# Patient Record
Sex: FEMALE | Race: WHITE | NOT HISPANIC OR LATINO | Employment: FULL TIME | RURAL
[De-identification: names, ages, dates, MRNs, and addresses within clinical notes are randomized per-mention and may not be internally consistent; named-entity substitution may affect disease eponyms.]

---

## 2024-04-17 ENCOUNTER — OFFICE VISIT (OUTPATIENT)
Dept: FAMILY MEDICINE CLINIC | Facility: CLINIC | Age: 24
End: 2024-04-17
Payer: COMMERCIAL

## 2024-04-17 VITALS
DIASTOLIC BLOOD PRESSURE: 80 MMHG | BODY MASS INDEX: 45.65 KG/M2 | WEIGHT: 267.38 LBS | SYSTOLIC BLOOD PRESSURE: 126 MMHG | HEIGHT: 64 IN | OXYGEN SATURATION: 100 % | HEART RATE: 92 BPM

## 2024-04-17 DIAGNOSIS — Z13.1 SCREENING FOR DIABETES MELLITUS: ICD-10-CM

## 2024-04-17 DIAGNOSIS — Z72.0 TOBACCO USE: ICD-10-CM

## 2024-04-17 DIAGNOSIS — E66.01 MORBID (SEVERE) OBESITY DUE TO EXCESS CALORIES: ICD-10-CM

## 2024-04-17 DIAGNOSIS — J30.2 SEASONAL ALLERGIES: ICD-10-CM

## 2024-04-17 DIAGNOSIS — Z11.59 NEED FOR HEPATITIS C SCREENING TEST: ICD-10-CM

## 2024-04-17 DIAGNOSIS — Z13.220 SCREENING, LIPID: ICD-10-CM

## 2024-04-17 DIAGNOSIS — Z00.00 ANNUAL PHYSICAL EXAM: Primary | ICD-10-CM

## 2024-04-17 DIAGNOSIS — Z12.4 SCREENING FOR CERVICAL CANCER: ICD-10-CM

## 2024-04-17 DIAGNOSIS — Z23 IMMUNIZATION DUE: ICD-10-CM

## 2024-04-17 LAB
BILIRUB BLD-MCNC: NEGATIVE MG/DL
CLARITY, POC: CLEAR
COLOR UR: YELLOW
EXPIRATION DATE: NORMAL
GLUCOSE UR STRIP-MCNC: NEGATIVE MG/DL
KETONES UR QL: NEGATIVE
LEUKOCYTE EST, POC: NEGATIVE
Lab: NORMAL
NITRITE UR-MCNC: NEGATIVE MG/ML
PH UR: 5.5 [PH] (ref 5–8)
PROT UR STRIP-MCNC: NEGATIVE MG/DL
RBC # UR STRIP: NEGATIVE /UL
SP GR UR: 1.03 (ref 1–1.03)
UROBILINOGEN UR QL: NORMAL

## 2024-04-17 NOTE — PATIENT INSTRUCTIONS
Obesity, Adult  Obesity is the condition of having too much total body fat. Being overweight or obese means that your weight is greater than what is considered healthy for your body size. Obesity is determined by a measurement called BMI (body mass index). BMI is an estimate of body fat and is calculated from height and weight. For adults, a BMI of 30 or higher is considered obese.  Obesity can lead to other health concerns and major illnesses, including:  Stroke.  Coronary artery disease (CAD).  Type 2 diabetes.  Some types of cancer, including cancers of the colon, breast, uterus, and gallbladder.  High blood pressure (hypertension).  High cholesterol.  Gallbladder stones.  Obesity can also contribute to:  Osteoarthritis.  Sleep apnea.  Infertility problems.  What are the causes?  Common causes of this condition include:  Eating daily meals that are high in calories, sugar, and fat.  Drinking high amounts of sugar-sweetened beverages, such as soft drinks.  Being born with genes that may make you more likely to become obese.  Having a medical condition that causes obesity, including:  Hypothyroidism.  Polycystic ovarian syndrome (PCOS).  Binge-eating disorder.  Cushing syndrome.  Taking certain medicines, such as steroids, antidepressants, and seizure medicines.  Not being physically active (sedentary lifestyle).  Not getting enough sleep.  What increases the risk?  The following factors may make you more likely to develop this condition:  Having a family history of obesity.  Living in an area with limited access to:  [place], recreation centers, or sidewalks.  Healthy food choices, such as grocery stores and [employer] markets.  What are the signs or symptoms?  The main sign of this condition is having too much body fat.  How is this diagnosed?  This condition is diagnosed based on:  Your BMI. If you are an adult with a BMI of 30 or higher, you are considered obese.  Your waist circumference. This measures the  distance around your waistline.  Your skinfold thickness. Your health care provider may gently pinch a fold of your skin and measure it.  You may have other tests to check for underlying conditions.  How is this treated?  Treatment for this condition often includes changing your lifestyle. Treatment may include some or all of the following:  Dietary changes. This may include developing a healthy meal plan.  Regular physical activity. This may include activity that causes your heart to beat faster (aerobic exercise) and strength training. Work with your health care provider to design an exercise program that works for you.  Medicine to help you lose weight if you are unable to lose one pound a week after six weeks of healthy eating and more physical activity.  Treating conditions that cause the obesity (underlying conditions).  Surgery. Surgical options may include gastric banding and gastric bypass. Surgery may be done if:  Other treatments have not helped to improve your condition.  You have a BMI of 40 or higher.  You have life-threatening health problems related to obesity.  Follow these instructions at home:  Eating and drinking    Follow recommendations from your health care provider about what you eat and drink. Your health care provider may advise you to:  Limit fast food, sweets, and processed snack foods.  Choose low-fat options, such as low-fat milk instead of whole milk.  Eat five or more servings of fruits or vegetables every day.  Choose healthy foods when you eat out.  Keep low-fat snacks available.  Limit sugary drinks, such as soda, fruit juice, sweetened iced tea, and flavored milk.  Drink enough water to keep your urine pale yellow.  Do not follow a fad diet. Fad diets can be unhealthy and even dangerous.  Other healthful choices include:  Eat at home more often. This gives you more control over what you eat.  Learn to read food labels. This will help you understand how much food is considered one  serving.  Learn what a healthy serving size is.  Physical activity  Exercise regularly, as told by your health care provider.  Most adults should get up to 150 minutes of moderate-intensity exercise every week.  Ask your health care provider what types of exercise are safe for you and how often you should exercise.  Warm up and stretch before being active.  Cool down and stretch after being active.  Rest between periods of activity.  Lifestyle  Work with your health care provider and a dietitian to set a weight-loss goal that is healthy and reasonable for you.  Limit your screen time.  Find ways to reward yourself that do not involve food.  Do not drink alcohol if:  Your health care provider tells you not to drink.  You are pregnant, may be pregnant, or are planning to become pregnant.  If you drink alcohol:  Limit how much you have to:  0-1 drink a day for women.  0-2 drinks a day for men.  Know how much alcohol is in your drink. In the [place] one drink equals one 12 oz bottle of beer (355 mL), one 5 oz glass of wine (148 mL), or one 1½ oz glass of hard liquor (44 mL).  General instructions  Keep a weight-loss journal to keep track of the food you eat and how much exercise you get.  Take over-the-counter and prescription medicines only as told by your health care provider.  Take vitamins and supplements only as told by your health care provider.  Consider joining a support group. Your health care provider may be able to recommend a support group.  Pay attention to your mental health as obesity can lead to depression or self esteem issues.  Keep all follow-up visits. This is important.  Contact a health care provider if:  You are unable to meet your weight-loss goal after six weeks of dietary and lifestyle changes.  You have trouble breathing.  Summary  Obesity is the condition of having too much total body fat.  Being overweight or obese means that your weight is greater than what is considered healthy for your body  size.  Work with your health care provider and a dietitian to set a weight-loss goal that is healthy and reasonable for you.  Exercise regularly, as told by your health care provider. Ask your health care provider what types of exercise are safe for you and how often you should exercise.  This information is not intended to replace advice given to you by your health care provider. Make sure you discuss any questions you have with your health care provider.  Document Revised: 07/26/2022 Document Reviewed: 07/26/2022  [email or web address] Patient Education © 2023 [email or web address]    Exercising to Lose Weight  Getting regular exercise is important for everyone. It is especially important if you are overweight. Being overweight increases your risk of heart disease, stroke, diabetes, high blood pressure, and several types of cancer. Exercising, and reducing the calories you consume, can help you lose weight and improve fitness and health.  Exercise can be moderate or vigorous intensity. To lose weight, most people need to do a certain amount of moderate or vigorous-intensity exercise each week.  How can exercise affect me?  You lose weight when you exercise enough to burn more calories than you eat. Exercise also reduces body fat and builds muscle. The more muscle you have, the more calories you burn. Exercise also:  Improves mood.  Reduces stress and tension.  Improves your overall fitness, flexibility, and endurance.  Increases bone strength.  Moderate-intensity exercise    Moderate-intensity exercise is any activity that gets you moving enough to burn at least three times more energy (calories) than if you were sitting.  Examples of moderate exercise include:  Walking a mile in 15 minutes.  Doing light yard work.  Biking at an easy pace.  Most people should get at least 150 minutes of moderate-intensity exercise a week to maintain their body weight.  Vigorous-intensity exercise  Vigorous-intensity exercise is any activity that gets  you moving enough to burn at least six times more calories than if you were sitting. When you exercise at this intensity, you should be working hard enough that you are not able to carry on a conversation.  Examples of vigorous exercise include:  Running.  Playing a team sport, such as football, basketball, and soccer.  Jumping rope.  Most people should get at least 75 minutes a week of vigorous exercise to maintain their body weight.  What actions can I take to lose weight?  The amount of exercise you need to lose weight depends on:  Your age.  The type of exercise.  Any health conditions you have.  Your overall physical ability.  Talk to your health care provider about how much exercise you need and what types of activities are safe for you.  Nutrition    Make changes to your diet as told by your health care provider or diet and nutrition specialist (dietitian). This may include:  Eating fewer calories.  Eating more protein.  Eating less unhealthy fats.  Eating a diet that includes fresh fruits and vegetables, whole grains, low-fat dairy products, and lean protein.  Avoiding foods with added fat, salt, and sugar.  Drink plenty of water while you exercise to prevent dehydration or heat stroke.  Activity  Choose an activity that you enjoy and set realistic goals. Your health care provider can help you make an exercise plan that works for you.  Exercise at a moderate or vigorous intensity most days of the week.  The intensity of exercise may vary from person to person. You can tell how intense a workout is for you by paying attention to your breathing and heartbeat. Most people will notice their breathing and heartbeat get faster with more intense exercise.  Do resistance training twice each week, such as:  Push-ups.  Sit-ups.  Lifting weights.  Using resistance bands.  Getting short amounts of exercise can be just as helpful as long, structured periods of exercise. If you have trouble finding time to exercise, try  doing these things as part of your daily routine:  Get up, stretch, and walk around every 30 minutes throughout the day.  Go for a walk during your lunch break.  Park your car farther away from your destination.  If you take public transportation, get off one stop early and walk the rest of the way.  Make phone calls while standing up and walking around.  Take the stairs instead of elevators or escalators.  Wear comfortable clothes and shoes with good support.  Do not exercise so much that you hurt yourself, feel dizzy, or get very short of breath.  Where to find more information  [place] Department of Health and Human Services: www.hhs.gov  Centers for Disease Control and Prevention: www.cdc.gov  Contact a health care provider:  Before starting a new exercise program.  If you have questions or concerns about your weight.  If you have a medical problem that keeps you from exercising.  Get help right away if:  You have any of the following while exercising:  Injury.  Dizziness.  Difficulty breathing or shortness of breath that does not go away when you stop exercising.  Chest pain.  Rapid heartbeat.  These symptoms may represent a serious problem that is an emergency. Do not wait to see if the symptoms will go away. Get medical help right away. Call your local emergency services (911 in the [place] Do not drive yourself to the hospital.  Summary  Getting regular exercise is especially important if you are overweight.  Being overweight increases your risk of heart disease, stroke, diabetes, high blood pressure, and several types of cancer.  Losing weight happens when you burn more calories than you eat.  Reducing the amount of calories you eat, and getting regular moderate or vigorous exercise each week, helps you lose weight.  This information is not intended to replace advice given to you by your health care provider. Make sure you discuss any questions you have with your health care provider.  Document Revised:  02/13/2022 Document Reviewed: 02/13/2022  [employer] Patient Education © 2023 [employer]    MyPlate from [employer]    MyPlate is an outline of a general healthy diet based on the Dietary Guidelines for Americans, 6836-8737, from the [place] Department of Agriculture (USDA). It sets guidelines for how much food you should eat from each food group based on your age, sex, and level of physical activity.  What are tips for following MyPlate?  To follow MyPlate recommendations:  Eat a wide variety of fruits and vegetables, grains, and protein foods.  Serve smaller portions and eat less food throughout the day.  Limit portion sizes to avoid overeating.  Enjoy your food.  Get at least 150 minutes of exercise every week. This is about 30 minutes each day, 5 or more days per week.  It can be difficult to have every meal look like MyPlate. Think about MyPlate as eating guidelines for an entire day, rather than each individual meal.  Fruits and vegetables  Make one half of your plate fruits and vegetables.  Eat many different colors of fruits and vegetables each day.  For a 2,000-calorie daily food plan, eat:  2½ cups of vegetables every day.  2 cups of fruit every day.  1 cup is equal to:  1 cup raw or cooked vegetables.  1 cup raw fruit.  1 medium-sized orange, apple, or banana.  1 cup 100% fruit or vegetable juice.  2 cups raw leafy greens, such as lettuce, spinach, or kale.  ½ cup dried fruit.  Grains  One fourth of your plate should be grains.  Make at least half of the grains you eat each day whole grains.  For a 2,000-calorie daily food plan, eat 6 oz of grains every day.  1 oz is equal to:  1 slice bread.  1 cup cereal.  ½ cup cooked rice, cereal, or pasta.  Protein  One fourth of your plate should be protein.  Eat a wide variety of protein foods, including meat, poultry, fish, eggs, beans, nuts, and tofu.  For a 2,000-calorie daily food plan, eat 5½ oz of protein every day.  1 oz is equal to:  1 oz meat, poultry, or fish.  ¼  cup cooked beans.  1 egg.  ½ oz nuts or seeds.  1 Tbsp peanut butter.  Dairy  Drink fat-free or low-fat (1%) milk.  Eat or drink dairy as a side to meals.  For a 2,000-calorie daily food plan, eat or drink 3 cups of dairy every day.  1 cup is equal to:  1 cup milk, yogurt, cottage cheese, or soy milk (soy beverage).  2 oz processed cheese.  1½ oz natural cheese.  Fats, oils, salt, and sugars  Only small amounts of oils are recommended.  Avoid foods that are high in calories and low in nutritional value (empty calories), like foods high in fat or added sugars.  Choose foods that are low in salt (sodium). Choose foods that have less than 140 milligrams (mg) of sodium per serving.  Drink water instead of sugary drinks. Drink enough fluid to keep your urine pale yellow.  Where to find support  Work with your health care provider or a dietitian to develop a customized eating plan that is right for you.  Download an husam (mobile application) to help you track your daily food intake.  Where to find more information  USDA: ChooseMyPlate.gov  Summary  MyPlate is a general guideline for healthy eating from the USDA. It is based on the Dietary Guidelines for Americans, 3897-7052.  In general, fruits and vegetables should take up one half of your plate, grains should take up one fourth of your plate, and protein should take up one fourth of your plate.  This information is not intended to replace advice given to you by your health care provider. Make sure you discuss any questions you have with your health care provider.  Document Revised: 11/08/2021 Document Reviewed: 11/08/2021  [employer] Patient Education © 2023 [employer]    Steps to Quit Smoking  Smoking tobacco is the leading cause of preventable death. It can affect almost every organ in the body. Smoking puts you and those around you at risk for developing many serious chronic diseases.  Quitting smoking can be very challenging. Do not get discouraged if you are not  successful the first time. Some people need to make many attempts to quit before they achieve long-term success. Do your best to stick to your quit plan, and talk with your health care provider if you have any questions or concerns.  How do I get ready to quit?  When you decide to quit smoking, create a plan to help you succeed. Before you quit:  Pick a date to quit. Set a date within the next 2 weeks to give you time to prepare.  Write down the reasons why you are quitting. Keep this list in places where you will see it often.  Tell your family, friends, and co-workers that you are quitting. Support from people you are close to can make quitting easier.  Talk with your health care provider about your options for quitting smoking.  Find out what treatment options are covered by your health insurance.  Identify people, places, things, and activities that make you want to smoke (triggers). Avoid them.  What first steps can I take to quit smoking?  Throw away all cigarettes at home, at work, and in your car.  Throw away smoking accessories, such as ashtrays and lighters.  Clean your car. Make sure to empty the ashtray.  Clean your home, including curtains and carpets.  What strategies can I use to quit smoking?  Talk with your health care provider about combining strategies, such as taking medicines while you are also receiving in-person counseling. Using these two strategies together makes you more likely to succeed in quitting than if you used either strategy on its own.  If you are pregnant or breastfeeding, talk with your health care provider about finding counseling or other support strategies to quit smoking. Do not take medicine to help you quit smoking unless your health care provider tells you to.  Quit right away  Quit smoking completely, instead of gradually reducing how much you smoke over a period of time. Stopping smoking right away may be more successful than gradually quitting.  Attend in-person  counseling to help you build problem-solving skills. You are more likely to succeed in quitting if you attend counseling sessions regularly. Even short sessions of 10 minutes can be effective.  Take medicine  You may take medicines to help you quit smoking. Some medicines require a prescription. You can also purchase over-the-counter medicines. Medicines may have nicotine in them to replace the nicotine in cigarettes. Medicines may:  Help to stop cravings.  Help to relieve withdrawal symptoms.  Your health care provider may recommend:  Nicotine patches, gum, or lozenges.  Nicotine inhalers or sprays.  Non-nicotine medicine that you take by mouth.  Find resources  Find resources and support systems that can help you quit smoking and remain smoke-free after you quit. These resources are most helpful when you use them often. They include:  Online chats with a counselor.  Telephone quitlines.  Printed self-help materials.  Support groups or group counseling.  Text messaging programs.  Mobile phone apps or applications. Use apps that can help you stick to your quit plan by providing reminders, tips, and encouragement. Examples of free services include Quit Guide from the CDC and smokefree.gov    What can I do to make it easier to quit?    Reach out to your family and friends for support and encouragement. Call telephone quitlines, such as 9-800-QUIT-NOW, reach out to support groups, or work with a counselor for support.  Ask people who smoke to avoid smoking around you.  Avoid places that trigger you to smoke, such as bars, parties, or smoke-break areas at work.  Spend time with people who do not smoke.  Lessen the stress in your life. Stress can be a smoking trigger for some people. To lessen stress, try:  Exercising regularly.  Doing deep-breathing exercises.  Doing yoga.  Meditating.  What benefits will I see if I quit smoking?  Over time, you should start to see positive results, such as:  Improved sense of smell and  taste.  Decreased coughing and sore throat.  Slower heart rate.  Lower blood pressure.  Clearer and healthier skin.  The ability to breathe more easily.  Fewer sick days.  Summary  Quitting smoking can be very challenging. Do not get discouraged if you are not successful the first time. Some people need to make many attempts to quit before they achieve long-term success.  When you decide to quit smoking, create a plan to help you succeed.  Quit smoking right away, not slowly over a period of time.  Find resources and support systems that can help you quit smoking and remain smoke-free after you quit.  This information is not intended to replace advice given to you by your health care provider. Make sure you discuss any questions you have with your health care provider.  Document Revised: 12/09/2022 Document Reviewed: 12/09/2022  Elsevier Patient Education © 2023 [employer]

## 2024-04-17 NOTE — ASSESSMENT & PLAN NOTE
Labs drawn.  UA completed.  Goal blood pressure less than 140/90.  Let me know if gets 2 or above that.  PHQ-9 completed and discussed.  No thoughts of suicide or hurting herself or anyone else.  Proper diet and exercise plan discussed and encouraged.  Patient states she will keep her appointment with gynecology Dr. Villanueva in June as scheduled and states she will discuss Pap smears with them.  She knows she is due a Pap smear.  Encouraged her to quit vaping.  Tdap vaccine given today in clinic.  Vaccine information sheet given.  Risk discussed and understood.  Patient tolerated well.  Patient states no risk or chance of pregnancy.  Annual dental exams encouraged.  Education provided.  Return to clinic or ED with any issues or concerns.

## 2024-04-17 NOTE — PROGRESS NOTES
"Chief Complaint  Annual Exam    Subjective          Laura Dexter presents to [facility] for preventative yearly exam.   History of Present Illness    Patient presents for annual exam.  Is fasting.  No smoking but she does vape.  No alcohol use.  Tries to watch her diet she does stay active.  States she has an appointment scheduled with gynecology Dr. Villanueva in June and states she will discuss Pap smears with them.  She would like to get a Tdap vaccine today in clinic.  Denies COVID vaccines.  Would like to have blood work completed including an A1c due to family history of diabetes.  Denies any risk or chance of STDs and denies being tested.  No dizziness no headache no chest pain no chest pressure no shortness of breath no trouble breathing no urinary or bowel issues.  Overall states she is doing well.    Objective   Vital Signs:   /80   Pulse 92   Ht 162.6 cm (64\")   Wt 121 kg (267 lb 6 oz)   SpO2 100%   BMI 45.89 kg/m²     Body mass index is 45.89 kg/m².    Predictive Model Details   No score data available for Risk of Fall        PHQ-9 Depression Screening  Little interest or pleasure in doing things? 0-->not at all   Feeling down, depressed, or hopeless? 0-->not at all   Trouble falling or staying asleep, or sleeping too much?     Feeling tired or having little energy?     Poor appetite or overeating?     Feeling bad about yourself - or that you are a failure or have let yourself or your family down?     Trouble concentrating on things, such as reading the newspaper or watching television?     Moving or speaking so slowly that other people could have noticed? Or the opposite - being so fidgety or restless that you have been moving around a lot more than usual?     Thoughts that you would be better off dead, or of hurting yourself in some way?     PHQ-9 Total Score 0   If you checked off any problems, how difficult have these problems made it for you to do your work, take " care of things at home, or get along with other people?       Patient screened positive for depression based on a PHQ-9 score of 0 on 4/17/2024. Follow-up recommendations include:        Immunization History   Administered Date(s) Administered    COVID-19 (MODERNA) 1st,2nd,3rd Dose Monovalent 03/31/2021, 04/28/2021    COVID-19 (MODERNA) Monovalent Original Booster 12/23/2021    DTaP 2002, 2002, 02/04/2003, 11/05/2003, 07/25/2007    DTaP, Unspecified 2002, 2002, 02/04/2003, 11/05/2003, 07/25/2007    Hep A, 2 Dose 08/05/2013, 07/29/2015    Hep B, Adolescent or Pediatric 2002, 2002, 08/13/2003    Hib (PRP-OMP) 2002, 2002, 08/13/2003    Hib (PRP-T) 2002, 2002    Hpv9 11/16/2018, 11/21/2019, 05/08/2020    IPV 2002, 2002, 02/04/2003, 07/25/2007    Influenza LAIV (Nasal) 09/11/2013    Influenza Seasonal Injectable 09/10/2012    MMR 11/05/2003, 07/25/2007    Meningococcal B,(Bexsero) 11/16/2018, 11/21/2019    Meningococcal C Conjugate 08/05/2013    Meningococcal MCV4P (Menactra) 08/05/2013, 11/16/2018    Meningococcal, Unspecified 08/05/2013    PEDS-Pneumococcal Conjugate (PCV7) 2002, 2002, 02/04/2003, 11/05/2003    Polio, Unspecified 2002, 2002, 02/04/2003, 07/25/2007    Tdap 08/05/2013    Varicella 08/13/2003, 07/25/2007       Review of Systems   Constitutional: Negative.    HENT: Negative.     Eyes: Negative.    Respiratory: Negative.     Cardiovascular: Negative.    Gastrointestinal: Negative.    Endocrine: Negative for polydipsia, polyphagia and polyuria.   Genitourinary: Negative.    Musculoskeletal: Negative.    Skin: Negative.    Neurological: Negative.    Psychiatric/Behavioral: Negative.         Past History:  Medical History: has no past medical history on file.   Surgical History: has a past surgical history that includes Tonsillectomy; Adenoidectomy; and Radial Osteotomy (Right).   Family History: family history is  not on file.   Social History: reports that she has never smoked. She has never used smokeless tobacco. She reports current alcohol use. She reports that she does not use drugs.      Current Outpatient Medications:     loratadine (CLARITIN) 10 MG tablet, Take 1 tablet by mouth Daily., Disp: , Rfl:    Allergies: Omnicef [cefdinir] and Strawberry flavor    Physical Exam  Constitutional:       Appearance: Normal appearance.   HENT:      Head: Normocephalic.      Right Ear: Tympanic membrane, ear canal and external ear normal.      Left Ear: Tympanic membrane, ear canal and external ear normal.      Nose: Nose normal.      Mouth/Throat:      Mouth: Mucous membranes are moist.      Pharynx: Oropharynx is clear.   Eyes:      Extraocular Movements: Extraocular movements intact.      Conjunctiva/sclera: Conjunctivae normal.      Pupils: Pupils are equal, round, and reactive to light.   Cardiovascular:      Rate and Rhythm: Normal rate and regular rhythm.      Heart sounds: Normal heart sounds.   Pulmonary:      Effort: Pulmonary effort is normal.      Breath sounds: Normal breath sounds.   Abdominal:      General: Abdomen is flat. Bowel sounds are normal.      Palpations: Abdomen is soft.   Genitourinary:     Comments: Denied   Musculoskeletal:         General: Normal range of motion.      Cervical back: Normal range of motion.   Skin:     General: Skin is warm.   Neurological:      General: No focal deficit present.      Mental Status: She is alert and oriented to person, place, and time. Mental status is at baseline.   Psychiatric:         Mood and Affect: Mood normal.         Behavior: Behavior normal.         Thought Content: Thought content normal.         Judgment: Judgment normal.          Result Review :                     Assessment and Plan    Diagnoses and all orders for this visit:    1. Annual physical exam (Primary)  Assessment & Plan:  Labs drawn.  UA completed.  Goal blood pressure less than 140/90.  Let me  know if gets 2 or above that.  PHQ-9 completed and discussed.  No thoughts of suicide or hurting herself or anyone else.  Proper diet and exercise plan discussed and encouraged.  Patient states she will keep her appointment with gynecology Dr. Villanueva in June as scheduled and states she will discuss Pap smears with them.  She knows she is due a Pap smear.  Encouraged her to quit vaping.  Tdap vaccine given today in clinic.  Vaccine information sheet given.  Risk discussed and understood.  Patient tolerated well.  Patient states no risk or chance of pregnancy.  Annual dental exams encouraged.  Education provided.  Return to clinic or ED with any issues or concerns.    Orders:  -     CBC & Differential  -     Comprehensive Metabolic Panel  -     TSH Rfx On Abnormal To Free T4  -     POC Urinalysis Dipstick, Automated; Future    2. Screening for diabetes mellitus  -     Hemoglobin A1c    3. Screening for cervical cancer    4. Need for hepatitis C screening test  -     Hepatitis C Antibody    5. Immunization due  -     Tdap Vaccine => 6yo IM (BOOSTRIX)    6. Screening, lipid  -     Lipid Panel    7. Seasonal allergies    8. Morbid (severe) obesity due to excess calories    9. Tobacco use              Class 3 Severe Obesity (BMI >=40). Obesity-related health conditions include the following: none. Obesity is unchanged. BMI is is above average; BMI management plan is completed. We discussed portion control and increasing exercise.       Follow Up   Return in about 1 year (around 4/17/2025).  Patient was given instructions and counseling regarding her condition or for health maintenance advice. Please see specific information pulled into the AVS if appropriate.     ELEAZAR Pena

## 2024-04-18 LAB
ALBUMIN SERPL-MCNC: 4.3 G/DL (ref 4–5)
ALBUMIN/GLOB SERPL: 1.7 {RATIO} (ref 1.2–2.2)
ALP SERPL-CCNC: 109 IU/L (ref 44–121)
ALT SERPL-CCNC: 21 IU/L (ref 0–32)
AST SERPL-CCNC: 22 IU/L (ref 0–40)
BASOPHILS # BLD AUTO: 0.1 X10E3/UL (ref 0–0.2)
BASOPHILS NFR BLD AUTO: 1 %
BILIRUB SERPL-MCNC: 0.4 MG/DL (ref 0–1.2)
BUN SERPL-MCNC: 9 MG/DL (ref 6–20)
BUN/CREAT SERPL: 11 (ref 9–23)
CALCIUM SERPL-MCNC: 9 MG/DL (ref 8.7–10.2)
CHLORIDE SERPL-SCNC: 101 MMOL/L (ref 96–106)
CHOLEST SERPL-MCNC: 115 MG/DL (ref 100–199)
CO2 SERPL-SCNC: 22 MMOL/L (ref 20–29)
CREAT SERPL-MCNC: 0.8 MG/DL (ref 0.57–1)
EGFRCR SERPLBLD CKD-EPI 2021: 107 ML/MIN/1.73
EOSINOPHIL # BLD AUTO: 0.2 X10E3/UL (ref 0–0.4)
EOSINOPHIL NFR BLD AUTO: 2 %
ERYTHROCYTE [DISTWIDTH] IN BLOOD BY AUTOMATED COUNT: 16.7 % (ref 11.7–15.4)
GLOBULIN SER CALC-MCNC: 2.6 G/DL (ref 1.5–4.5)
GLUCOSE SERPL-MCNC: 85 MG/DL (ref 70–99)
HBA1C MFR BLD: 5.7 % (ref 4.8–5.6)
HCT VFR BLD AUTO: 29.6 % (ref 34–46.6)
HCV IGG SERPL QL IA: NON REACTIVE
HDLC SERPL-MCNC: 43 MG/DL
HGB BLD-MCNC: 7.9 G/DL (ref 11.1–15.9)
IMM GRANULOCYTES # BLD AUTO: 0.1 X10E3/UL (ref 0–0.1)
IMM GRANULOCYTES NFR BLD AUTO: 1 %
LDLC SERPL CALC-MCNC: 53 MG/DL (ref 0–99)
LYMPHOCYTES # BLD AUTO: 3.1 X10E3/UL (ref 0.7–3.1)
LYMPHOCYTES NFR BLD AUTO: 26 %
MCH RBC QN AUTO: 18.2 PG (ref 26.6–33)
MCHC RBC AUTO-ENTMCNC: 26.7 G/DL (ref 31.5–35.7)
MCV RBC AUTO: 68 FL (ref 79–97)
MONOCYTES # BLD AUTO: 0.6 X10E3/UL (ref 0.1–0.9)
MONOCYTES NFR BLD AUTO: 5 %
NEUTROPHILS # BLD AUTO: 7.8 X10E3/UL (ref 1.4–7)
NEUTROPHILS NFR BLD AUTO: 65 %
PLATELET # BLD AUTO: 530 X10E3/UL (ref 150–450)
POTASSIUM SERPL-SCNC: 3.9 MMOL/L (ref 3.5–5.2)
PROT SERPL-MCNC: 6.9 G/DL (ref 6–8.5)
RBC # BLD AUTO: 4.34 X10E6/UL (ref 3.77–5.28)
SODIUM SERPL-SCNC: 138 MMOL/L (ref 134–144)
TRIGL SERPL-MCNC: 101 MG/DL (ref 0–149)
TSH SERPL DL<=0.005 MIU/L-ACNC: 2.39 UIU/ML (ref 0.45–4.5)
VLDLC SERPL CALC-MCNC: 19 MG/DL (ref 5–40)
WBC # BLD AUTO: 11.9 X10E3/UL (ref 3.4–10.8)

## 2024-04-23 DIAGNOSIS — D64.9 ANEMIA, UNSPECIFIED TYPE: Primary | ICD-10-CM

## 2024-04-23 DIAGNOSIS — R79.89 HIGH PLATELET COUNT: ICD-10-CM

## 2024-04-23 DIAGNOSIS — D72.829 LEUKOCYTOSIS, UNSPECIFIED TYPE: ICD-10-CM

## 2024-04-24 ENCOUNTER — TELEPHONE (OUTPATIENT)
Dept: FAMILY MEDICINE CLINIC | Facility: CLINIC | Age: 24
End: 2024-04-24
Payer: COMMERCIAL

## 2024-04-24 NOTE — TELEPHONE ENCOUNTER
Lvm  Hub to relay  LVM for pt to call us back to schedule lab appt for added on labs that needed to be drawn based on lab results

## 2024-04-26 ENCOUNTER — LAB (OUTPATIENT)
Dept: FAMILY MEDICINE CLINIC | Facility: CLINIC | Age: 24
End: 2024-04-26
Payer: COMMERCIAL

## 2024-04-27 LAB
BASOPHILS # BLD AUTO: 0.1 X10E3/UL (ref 0–0.2)
BASOPHILS NFR BLD AUTO: 1 %
EOSINOPHIL # BLD AUTO: 0.1 X10E3/UL (ref 0–0.4)
EOSINOPHIL NFR BLD AUTO: 1 %
ERYTHROCYTE [DISTWIDTH] IN BLOOD BY AUTOMATED COUNT: 16.6 % (ref 11.7–15.4)
FERRITIN SERPL-MCNC: 6 NG/ML (ref 15–150)
FOLATE SERPL-MCNC: 8.7 NG/ML
HCT VFR BLD AUTO: 27.9 % (ref 34–46.6)
HGB BLD-MCNC: 7.7 G/DL (ref 11.1–15.9)
IMM GRANULOCYTES # BLD AUTO: 0.1 X10E3/UL (ref 0–0.1)
IMM GRANULOCYTES NFR BLD AUTO: 1 %
IRON SERPL-MCNC: 17 UG/DL (ref 27–159)
LYMPHOCYTES # BLD AUTO: 2.9 X10E3/UL (ref 0.7–3.1)
LYMPHOCYTES NFR BLD AUTO: 23 %
MCH RBC QN AUTO: 18.3 PG (ref 26.6–33)
MCHC RBC AUTO-ENTMCNC: 27.6 G/DL (ref 31.5–35.7)
MCV RBC AUTO: 66 FL (ref 79–97)
MONOCYTES # BLD AUTO: 0.6 X10E3/UL (ref 0.1–0.9)
MONOCYTES NFR BLD AUTO: 5 %
NEUTROPHILS # BLD AUTO: 8.8 X10E3/UL (ref 1.4–7)
NEUTROPHILS NFR BLD AUTO: 69 %
PLATELET # BLD AUTO: 449 X10E3/UL (ref 150–450)
RBC # BLD AUTO: 4.2 X10E6/UL (ref 3.77–5.28)
VIT B12 SERPL-MCNC: 519 PG/ML (ref 232–1245)
WBC # BLD AUTO: 12.6 X10E3/UL (ref 3.4–10.8)

## 2024-06-05 ENCOUNTER — OFFICE VISIT (OUTPATIENT)
Dept: OBSTETRICS AND GYNECOLOGY | Age: 24
End: 2024-06-05
Payer: COMMERCIAL

## 2024-06-05 VITALS
HEIGHT: 64 IN | SYSTOLIC BLOOD PRESSURE: 122 MMHG | BODY MASS INDEX: 44.39 KG/M2 | DIASTOLIC BLOOD PRESSURE: 64 MMHG | WEIGHT: 260 LBS

## 2024-06-05 DIAGNOSIS — Z01.419 WELL FEMALE EXAM WITH ROUTINE GYNECOLOGICAL EXAM: Primary | ICD-10-CM

## 2024-06-05 DIAGNOSIS — Z12.4 SCREENING FOR MALIGNANT NEOPLASM OF CERVIX: ICD-10-CM

## 2024-06-05 DIAGNOSIS — D50.0 IRON DEFICIENCY ANEMIA DUE TO CHRONIC BLOOD LOSS: ICD-10-CM

## 2024-06-05 DIAGNOSIS — Z13.89 SCREENING FOR BLOOD OR PROTEIN IN URINE: ICD-10-CM

## 2024-06-05 DIAGNOSIS — N92.0 MENORRHAGIA WITH REGULAR CYCLE: ICD-10-CM

## 2024-06-05 DIAGNOSIS — N93.9 ABNORMAL UTERINE BLEEDING (AUB): ICD-10-CM

## 2024-06-05 DIAGNOSIS — E66.01 MORBID OBESITY WITH BMI OF 40.0-44.9, ADULT: ICD-10-CM

## 2024-06-05 DIAGNOSIS — Z11.3 SCREEN FOR STD (SEXUALLY TRANSMITTED DISEASE): ICD-10-CM

## 2024-06-05 DIAGNOSIS — Z11.51 SCREENING FOR HUMAN PAPILLOMAVIRUS (HPV): ICD-10-CM

## 2024-06-05 PROBLEM — Z00.00 ANNUAL PHYSICAL EXAM: Status: RESOLVED | Noted: 2024-04-17 | Resolved: 2024-06-05

## 2024-06-05 PROBLEM — Z23 IMMUNIZATION DUE: Status: RESOLVED | Noted: 2024-04-17 | Resolved: 2024-06-05

## 2024-06-05 PROBLEM — Z13.1 SCREENING FOR DIABETES MELLITUS: Status: RESOLVED | Noted: 2024-04-17 | Resolved: 2024-06-05

## 2024-06-05 PROBLEM — Z11.59 NEED FOR HEPATITIS C SCREENING TEST: Status: RESOLVED | Noted: 2024-04-17 | Resolved: 2024-06-05

## 2024-06-05 PROBLEM — J30.2 SEASONAL ALLERGIES: Status: RESOLVED | Noted: 2024-04-17 | Resolved: 2024-06-05

## 2024-06-05 PROCEDURE — 99213 OFFICE O/P EST LOW 20 MIN: CPT | Performed by: OBSTETRICS & GYNECOLOGY

## 2024-06-05 PROCEDURE — 99385 PREV VISIT NEW AGE 18-39: CPT | Performed by: OBSTETRICS & GYNECOLOGY

## 2024-06-05 RX ORDER — ACETAMINOPHEN 325 MG/1
650 TABLET ORAL ONCE
OUTPATIENT
Start: 2024-06-10

## 2024-06-05 RX ORDER — SODIUM CHLORIDE 9 MG/ML
20 INJECTION, SOLUTION INTRAVENOUS ONCE
OUTPATIENT
Start: 2024-06-10

## 2024-06-05 RX ORDER — CARIPRAZINE 1.5 MG/1
1.5 CAPSULE, GELATIN COATED ORAL
COMMUNITY
Start: 2024-05-05

## 2024-06-05 NOTE — PROGRESS NOTES
[facility]   Obstetrics and Gynecology     2024    Patient: Laura Dexter          MR#:[id number]    History of Present Illness    Chief Complaint   Patient presents with    Gynecologic Exam     CC: New Annual, very heavy periods on period now       23 y.o. female  who presents for annual exam.    The patient presents with complaint of extremely heavy periods for the past year or so.  She is been seen by her primary care with a hemoglobin of 7.        She reports being on her menstrual cycle with very heavy bleeding today    Relevant data reviewed:  Ferritin (2024 09:13)  Iron (2024 09:13)  CBC & Differential (2024 09:13)    Patient's last menstrual period was 2024.  Obstetric History:  OB History          0    Para   0    Term   0       0    AB   0    Living   0         SAB   0    IAB   0    Ectopic   0    Molar   0    Multiple   0    Live Births   0               Menstrual History:     Patient's last menstrual period was 2024.       Social History     Substance and Sexual Activity   Sexual Activity Yes    Partners: Female    Birth control/protection: Same-sex partner     ______________________________________  Patient Active Problem List   Diagnosis    Menorrhagia with regular cycle    Iron deficiency anemia due to chronic blood loss    Morbid obesity with BMI of 40.0-44.9, adult    Abnormal uterine bleeding (AUB)     Past Medical History:   Diagnosis Date    Anemia      Past Surgical History:   Procedure Laterality Date    ADENOIDECTOMY  2007    RADIAL OSTEOTOMY Right     TONSILLECTOMY  2007     Social History     Tobacco Use   Smoking Status Never    Passive exposure: Never   Smokeless Tobacco Current     Family History   Problem Relation Age of Onset    Diabetes Paternal Grandfather     Diabetes Paternal Grandmother     Hypertension Paternal Uncle      Prior to Admission medications    Medication Sig Start Date End Date Taking? Authorizing  "Provider   loratadine (CLARITIN) 10 MG tablet Take 1 tablet by mouth Daily.   Yes Emergency, Nurse Nahid, RN   sertraline (ZOLOFT) 50 MG tablet 1 tablet. 6/5/24  Yes Provider, Historical, MD   Vraylar 1.5 MG capsule capsule 1 capsule. 5/5/24  Yes ProviderDonna MD     _______________________________________    Current contraception:  same sex partner  History of abnormal Pap smear: no  Family history of uterine or ovarian cancer: no  Family History of colon cancer/colon polyps: no  History of abnormal mammogram: no  History of abnormal lipids: no    The following portions of the patient's history were reviewed and updated as appropriate: allergies, current medications, past family history, past medical history, past social history, past surgical history, and problem list.    Review of Systems    Pertinent items are noted in HPI.       Objective   Physical Exam    /64   Ht 162.6 cm (64\")   Wt 118 kg (260 lb)   LMP 06/02/2024   BMI 44.63 kg/m²    BP Readings from Last 3 Encounters:   06/05/24 122/64   04/17/24 126/80   08/15/23 145/97      Wt Readings from Last 3 Encounters:   06/05/24 118 kg (260 lb)   04/17/24 121 kg (267 lb 6 oz)   08/15/23 117 kg (258 lb)        BMI: Estimated body mass index is 44.63 kg/m² as calculated from the following:    Height as of this encounter: 162.6 cm (64\").    Weight as of this encounter: 118 kg (260 lb).       General: alert, appears stated age, and cooperative   Heart: regular rate and rhythm, S1, S2 normal, no murmur, click, rub or gallop   Lungs: clear to auscultation bilaterally   Abdomen: soft, non-tender, without masses, no organomegaly   Breast: inspection negative, no nipple discharge or bleeding, no masses or nodularity palpable   External genitalia/Vulva: External genitalia including bartholin's glands, Urethra, Humphreys's gland and urethra meatus are normal, Perineum, rectum and anus appear normal , and Bladder appears normal without significant prolapse  "   Vagina: normal mucosa, normal discharge   Cervix: no lesions   Uterus: normal size and non-tender   Adnexa: exam limited by habitus     As part of wellness and prevention, the following topics were discussed with the patient:  Encouraged self breast exam  Physical activity and regular exercised encouraged.         Problem List   Meds  History  Prep for Surg   Imagin}    Assessment:  Diagnoses and all orders for this visit:    1. Well female exam with routine gynecological exam (Primary)  -     IGP, Apt HPV,rfx 16 / 18,45    2. Screening for human papillomavirus (HPV)  -     IGP, Apt HPV,rfx 16 / 18,45    3. Screening for malignant neoplasm of cervix  -     IGP, Apt HPV,rfx 16 / 18,45    4. Screening for blood or protein in urine  -     POC Urinalysis Dipstick    5. Screen for STD (sexually transmitted disease)  -     NuSwab VG+ - Swab, Vagina    6. Abnormal uterine bleeding (AUB)  -     Protime-INR  -     PTT+FACTOR VIII+VWF AG+VWF AC    7. Menorrhagia with regular cycle  -     norethindrone-ethinyl estradiol (Necon , ,) 1-35 MG-MCG per tablet; Take 1 tablet by mouth Daily.  Dispense: 84 tablet; Refill: 4    8. Iron deficiency anemia due to chronic blood loss  -     Ambulatory Referral to [facility] For Infusion Treatment    9. Morbid obesity with BMI of 40.0-44.9, adult      Plan:  Return in 1 year (on 2025) for Annual exam.    Jeremias Villanueva MD  2024 14:23 EDT

## 2024-06-06 ENCOUNTER — PATIENT ROUNDING (BHMG ONLY) (OUTPATIENT)
Dept: OBSTETRICS AND GYNECOLOGY | Age: 24
End: 2024-06-06
Payer: COMMERCIAL

## 2024-06-06 NOTE — PROGRESS NOTES
A MY CHART MESSAGE HAS [place]EN [place] [facility] [place]

## 2024-07-03 ENCOUNTER — OFFICE VISIT (OUTPATIENT)
Dept: OBSTETRICS AND GYNECOLOGY | Age: 24
End: 2024-07-03
Payer: COMMERCIAL

## 2024-07-03 VITALS
HEIGHT: 64 IN | DIASTOLIC BLOOD PRESSURE: 68 MMHG | WEIGHT: 261 LBS | BODY MASS INDEX: 44.56 KG/M2 | SYSTOLIC BLOOD PRESSURE: 124 MMHG

## 2024-07-03 DIAGNOSIS — D50.0 IRON DEFICIENCY ANEMIA DUE TO CHRONIC BLOOD LOSS: Primary | ICD-10-CM

## 2024-07-03 DIAGNOSIS — Z12.4 ENCOUNTER FOR SCREENING FOR CERVICAL CANCER: ICD-10-CM

## 2024-07-03 DIAGNOSIS — Z13.0 SCREENING, ANEMIA, DEFICIENCY, IRON: ICD-10-CM

## 2024-07-03 DIAGNOSIS — N93.9 ABNORMAL UTERINE BLEEDING (AUB): ICD-10-CM

## 2024-07-03 DIAGNOSIS — Z11.51 SCREENING FOR HUMAN PAPILLOMAVIRUS (HPV): ICD-10-CM

## 2024-07-03 PROCEDURE — 99213 OFFICE O/P EST LOW 20 MIN: CPT | Performed by: OBSTETRICS & GYNECOLOGY

## 2024-07-03 RX ORDER — FLUOXETINE HYDROCHLORIDE 20 MG/1
20 CAPSULE ORAL
COMMUNITY
Start: 2024-07-03

## 2024-07-03 RX ORDER — NORETHINDRONE ACETATE AND ETHINYL ESTRADIOL .02; 1 MG/1; MG/1
1 TABLET ORAL DAILY
Start: 2024-07-03 | End: 2025-07-03

## 2024-07-03 NOTE — PROGRESS NOTES
[facility]   Obstetrics and Gynecology     7/3/2024      Patient:  Laura Dexter   MR#:[id number]    Office note    Chief Complaint   Patient presents with    Gynecologic Exam     CC: pap only,        Subjective     History of Present Illness  23 y.o. female  presents for follow-up on Pap smear that was not performed last visit because of heavy bleeding.  The patient has a pretty significant iron deficiency anemia and needs further iron studies so her iron fusions can be set up      Relevant data reviewed:       Objective   Patient Active Problem List   Diagnosis    Menorrhagia with regular cycle    Iron deficiency anemia due to chronic blood loss    Morbid obesity with BMI of 40.0-44.9, adult    Abnormal uterine bleeding (AUB)       Past Medical History:   Diagnosis Date    Anemia      Past Surgical History:   Procedure Laterality Date    ADENOIDECTOMY  2007    RADIAL OSTEOTOMY Right     TONSILLECTOMY       Obstetric History:  OB History          0    Para   0    Term   0       0    AB   0    Living   0         SAB   0    IAB   0    Ectopic   0    Molar   0    Multiple   0    Live Births   0               Menstrual History:     Patient's last menstrual period was 2024.       The patient has never been pregnant.  Family History   Problem Relation Age of Onset    Diabetes Paternal Grandfather     Diabetes Paternal Grandmother     Hypertension Paternal Uncle     Breast cancer Neg Hx     Ovarian cancer Neg Hx     Uterine cancer Neg Hx      Social History     Tobacco Use    Smoking status: Never     Passive exposure: Never    Smokeless tobacco: Current   Vaping Use    Vaping status: Every Day    Substances: Nicotine    Devices: Disposable   Substance Use Topics    Alcohol use: Yes     Comment: RARLY    Drug use: Never     Omnicef [cefdinir] and Strawberry flavor    Current Outpatient Medications:     FLUoxetine (PROzac) 20 MG capsule, 1 capsule., Disp: , Rfl:     loratadine  "(CLARITIN) 10 MG tablet, Take 1 tablet by mouth Daily., Disp: , Rfl:     Vraylar 1.5 MG capsule capsule, 1 capsule., Disp: , Rfl:     norethindrone-ethinyl estradiol (MICROGESTIN) 1-20 MG-MCG per tablet, Take 1 tablet by mouth Daily., Disp: , Rfl:     The following portions of the patient's history were reviewed and updated as appropriate: allergies, current medications, past family history, past medical history, past social history, past surgical history, and problem list.    Review of Systems   Genitourinary:  Positive for menstrual problem and vaginal bleeding.       BP Readings from Last 3 Encounters:   07/03/24 124/68   06/05/24 122/64   04/17/24 126/80      Wt Readings from Last 3 Encounters:   07/03/24 118 kg (261 lb)   06/05/24 118 kg (260 lb)   04/17/24 121 kg (267 lb 6 oz)      BMI: Estimated body mass index is 44.8 kg/m² as calculated from the following:    Height as of this encounter: 162.6 cm (64\").    Weight as of this encounter: 118 kg (261 lb). BSA: Estimated body surface area is 2.19 meters squared as calculated from the following:    Height as of this encounter: 162.6 cm (64\").    Weight as of this encounter: 118 kg (261 lb).    Physical Exam  Vitals and nursing note reviewed.   Constitutional:       Appearance: She is well-developed.   HENT:      Head: Normocephalic and atraumatic.   Cardiovascular:      Rate and Rhythm: Normal rate.   Pulmonary:      Effort: Pulmonary effort is normal.   Abdominal:      General: Bowel sounds are normal. There is no distension.      Palpations: Abdomen is soft.      Tenderness: There is no abdominal tenderness.   Skin:     General: Skin is warm and dry.   Neurological:      Mental Status: She is alert and oriented to person, place, and time.   Psychiatric:         Behavior: Behavior normal.         Thought Content: Thought content normal.         Judgment: Judgment normal.            Assessment & Plan   Diagnoses and all orders for this visit:    1. Iron " deficiency anemia due to chronic blood loss (Primary)  -     PTT+FACTOR VIII+VWF AG+VWF AC    2. Screening, anemia, deficiency, iron  -     CBC (No Diff)    3. Abnormal uterine bleeding (AUB)  -     PTT+FACTOR VIII+VWF AG+VWF AC    Other orders  -     norethindrone-ethinyl estradiol (MICROGESTIN) 1-20 MG-MCG per tablet; Take 1 tablet by mouth Daily.           No follow-ups on file.        Jeremias Villanueva MD   7/3/2024 13:24 EDT

## 2024-07-04 LAB
FERRITIN SERPL-MCNC: 6 NG/ML (ref 15–150)
IRON SATN MFR SERPL: 3 % (ref 15–55)
IRON SERPL-MCNC: 15 UG/DL (ref 27–159)
TIBC SERPL-MCNC: 450 UG/DL (ref 250–450)
UIBC SERPL-MCNC: 435 UG/DL (ref 131–425)

## 2024-07-07 LAB
CYTOLOGIST CVX/VAG CYTO: NORMAL
CYTOLOGY CVX/VAG DOC CYTO: NORMAL
CYTOLOGY CVX/VAG DOC THIN PREP: NORMAL
DX ICD CODE: NORMAL
HPV I/H RISK 4 DNA CVX QL PROBE+SIG AMP: NEGATIVE
Lab: NORMAL
OTHER STN SPEC: NORMAL
STAT OF ADQ CVX/VAG CYTO-IMP: NORMAL

## 2024-07-10 LAB
APTT PPP: 26.1 SEC
ERYTHROCYTE [DISTWIDTH] IN BLOOD BY AUTOMATED COUNT: 17.1 % (ref 11.7–15.4)
FACT VIII ACT/NOR PPP: 107 %
HCT VFR BLD AUTO: 32.3 % (ref 34–46.6)
HGB BLD-MCNC: 8.9 G/DL (ref 11.1–15.9)
INR PPP: 1 RATIO
MCH RBC QN AUTO: 19 PG (ref 26.6–33)
MCHC RBC AUTO-ENTMCNC: 27.6 G/DL (ref 31.5–35.7)
MCV RBC AUTO: 69 FL (ref 79–97)
PLATELET # BLD AUTO: 426 X10E3/UL (ref 150–450)
PROTHROMBIN TIME: 10.3 SEC
RBC # BLD AUTO: 4.69 X10E6/UL (ref 3.77–5.28)
VWF AG ACT/NOR PPP IA: 90 %
VWF:RCO ACT/NOR PPP PL AGG: 88 %
WBC # BLD AUTO: 13.3 X10E3/UL (ref 3.4–10.8)

## 2024-07-11 ENCOUNTER — HOSPITAL ENCOUNTER (OUTPATIENT)
Dept: INFUSION THERAPY | Facility: HOSPITAL | Age: 24
Discharge: HOME | End: 2024-07-11
Admitting: OBSTETRICS & GYNECOLOGY
Payer: COMMERCIAL

## 2024-07-11 VITALS
TEMPERATURE: 97.3 F | HEART RATE: 84 BPM | DIASTOLIC BLOOD PRESSURE: 88 MMHG | RESPIRATION RATE: 20 BRPM | SYSTOLIC BLOOD PRESSURE: 122 MMHG | OXYGEN SATURATION: 100 %

## 2024-07-11 DIAGNOSIS — D50.0 IRON DEFICIENCY ANEMIA DUE TO CHRONIC BLOOD LOSS: ICD-10-CM

## 2024-07-11 DIAGNOSIS — N93.9 ABNORMAL UTERINE BLEEDING (AUB): Primary | ICD-10-CM

## 2024-07-11 PROCEDURE — 96366 THER/PROPH/DIAG IV INF ADDON: CPT

## 2024-07-11 PROCEDURE — 96375 TX/PRO/DX INJ NEW DRUG ADDON: CPT

## 2024-07-11 PROCEDURE — 25010000002 NA FERRIC GLUC CPLX PER 12.5 MG: Performed by: OBSTETRICS & GYNECOLOGY

## 2024-07-11 PROCEDURE — 25010000002 DIPHENHYDRAMINE PER 50 MG: Performed by: OBSTETRICS & GYNECOLOGY

## 2024-07-11 PROCEDURE — 96365 THER/PROPH/DIAG IV INF INIT: CPT

## 2024-07-11 PROCEDURE — 25810000003 SODIUM CHLORIDE 0.9 % SOLUTION: Performed by: OBSTETRICS & GYNECOLOGY

## 2024-07-11 RX ORDER — SODIUM CHLORIDE 9 MG/ML
20 INJECTION, SOLUTION INTRAVENOUS ONCE
OUTPATIENT
Start: 2024-07-18

## 2024-07-11 RX ORDER — SODIUM CHLORIDE 9 MG/ML
20 INJECTION, SOLUTION INTRAVENOUS ONCE
Status: DISCONTINUED | OUTPATIENT
Start: 2024-07-11 | End: 2024-07-13 | Stop reason: HOSPADM

## 2024-07-11 RX ORDER — ACETAMINOPHEN 325 MG/1
650 TABLET ORAL ONCE
Status: COMPLETED | OUTPATIENT
Start: 2024-07-11 | End: 2024-07-11

## 2024-07-11 RX ORDER — ACETAMINOPHEN 325 MG/1
650 TABLET ORAL ONCE
OUTPATIENT
Start: 2024-07-18

## 2024-07-11 RX ADMIN — ACETAMINOPHEN 325MG 650 MG: 325 TABLET ORAL at 09:11

## 2024-07-11 RX ADMIN — SODIUM CHLORIDE 250 MG: 9 INJECTION, SOLUTION INTRAVENOUS at 10:02

## 2024-07-11 RX ADMIN — DIPHENHYDRAMINE HYDROCHLORIDE 25 MG: 50 INJECTION, SOLUTION INTRAMUSCULAR; INTRAVENOUS at 09:33

## 2024-07-16 ENCOUNTER — CONSULT (OUTPATIENT)
Dept: ONCOLOGY | Facility: CLINIC | Age: 24
End: 2024-07-16
Payer: COMMERCIAL

## 2024-07-16 VITALS
HEIGHT: 64 IN | RESPIRATION RATE: 18 BRPM | BODY MASS INDEX: 44.39 KG/M2 | DIASTOLIC BLOOD PRESSURE: 82 MMHG | OXYGEN SATURATION: 98 % | TEMPERATURE: 99.1 F | HEART RATE: 99 BPM | SYSTOLIC BLOOD PRESSURE: 135 MMHG | WEIGHT: 260 LBS

## 2024-07-16 DIAGNOSIS — D50.0 IRON DEFICIENCY ANEMIA DUE TO CHRONIC BLOOD LOSS: Primary | ICD-10-CM

## 2024-07-16 PROCEDURE — 99203 OFFICE O/P NEW LOW 30 MIN: CPT | Performed by: INTERNAL MEDICINE

## 2024-07-16 NOTE — PROGRESS NOTES
CHIEF COMPLAINT: Heavy menses    REASON FOR REFERRAL: Iron deficiency anemia      RECORDS OBTAINED  Records of the patients history including those obtained from primary care were reviewed and summarized in detail.        HISTORY OF PRESENT ILLNESS:  The patient is a 23 y.o.  female, referred for iron deficiency anemia in the face of heavy menses    REVIEW OF SYSTEMS:  Heavy menses    Past Medical History:   Diagnosis Date    Anemia      Past Surgical History:   Procedure Laterality Date    ADENOIDECTOMY  2007    RADIAL OSTEOTOMY Right     TONSILLECTOMY  2007       Current Outpatient Medications on File Prior to Visit   Medication Sig Dispense Refill    FLUoxetine (PROzac) 20 MG capsule 1 capsule.      loratadine (CLARITIN) 10 MG tablet Take 1 tablet by mouth Daily.      norethindrone-ethinyl estradiol (MICROGESTIN) 1-20 MG-MCG per tablet Take 1 tablet by mouth Daily.      Vraylar 1.5 MG capsule capsule Take 2 capsules by mouth Daily.       No current facility-administered medications on file prior to visit.       Allergies   Allergen Reactions    Omnicef [Cefdinir] GI Intolerance    Strawberry Flavor Unknown - High Severity    Latex Rash       Social History     Socioeconomic History    Marital status:    Tobacco Use    Smoking status: Every Day     Types: Cigarettes     Passive exposure: Never    Smokeless tobacco: Current   Vaping Use    Vaping status: Every Day    Substances: Nicotine    Devices: Disposable   Substance and Sexual Activity    Alcohol use: Yes     Comment: RARLY    Drug use: Never    Sexual activity: Yes     Partners: Female     Birth control/protection: Same-sex partner       Family History   Problem Relation Age of Onset    Diabetes Paternal Grandfather     Diabetes Paternal Grandmother     Hypertension Paternal Uncle     Breast cancer Neg Hx     Ovarian cancer Neg Hx     Uterine cancer Neg Hx        PHYSICAL EXAM:  No jaundice icterus or pallor.  No respiratory distress.    /82    "Pulse 99   Temp 99.1 °F (37.3 °C)   Resp 18   Ht 162.6 cm (64\")   Wt 118 kg (260 lb)   SpO2 98%   BMI 44.63 kg/m²     ECOG score: 0           ECOG: (0) Fully Active - Able to Carry On All Pre-disease Performance Without Restriction    Lab Results   Component Value Date    HGB 8.9 (L) 07/03/2024    HCT 32.3 (L) 07/03/2024    MCV 69 (L) 07/03/2024     07/03/2024    WBC 13.3 (H) 07/03/2024    NEUTROABS 8.8 (H) 04/26/2024    LYMPHSABS 2.9 04/26/2024    MONOSABS 0.6 04/26/2024    EOSABS 0.1 04/26/2024    BASOSABS 0.1 04/26/2024     Lab Results   Component Value Date    GLUCOSE 85 04/17/2024    BUN 9 04/17/2024    CREATININE 0.80 04/17/2024     04/17/2024    K 3.9 04/17/2024     04/17/2024    CO2 22 04/17/2024    CALCIUM 9.0 04/17/2024    PROTEINTOT 7.2 03/29/2021    ALBUMIN 4.3 04/17/2024    BILITOT 0.4 04/17/2024    ALKPHOS 109 04/17/2024    AST 22 04/17/2024    ALT 21 04/17/2024         Assessment & Plan   1.  Iron deficiency with menorrhagia.  Intolerant of oral iron.  Gynecologist Jasmina started Microgestin for abnormal uterine bleeding.  INR, von Willebrand factor activity, von Willebrand factor antigen, PTT, factor VIII activity all normal.  Hemoglobin 7.7 in April with MCV 66.  7/3/2024 hemoglobin up to 8.9 with MCV 69 white count 13,300 likely reactive.  Ferritin 6 with iron 15 saturation 3% and gynecologist has started IV iron in [place].  Iron deficiency anemia in a menstruating 23-year-old female does not require formal hematology oncology evaluation and her iron deficiency is being managed well by her gynecologist, Jeremias Villanueva, and Jasmina.  I will see her on an as-needed basis.    Total time of care reviewing past records and discussing this with patient took 35 minutes of patient care time throughout the day today.      Kvng Putnam MD    7/16/2024  "

## 2024-07-16 NOTE — LETTER
July 16, 2024       No Recipients    Patient: Laura Dexter   YOB: 2000   Date of Visit: 7/16/2024     Dear ELEAZAR Pena:       Thank you for referring Laura Dexter to me for evaluation. Below are the relevant portions of my assessment and plan of care.    If you have questions, please do not hesitate to call me. I look forward to following Laura along with you.         Sincerely,        Kvng Putnam MD        CC:   No Recipients    Kvng Putnam MD  07/16/24 1227  Sign when Signing Visit  CHIEF COMPLAINT: Heavy menses    REASON FOR REFERRAL: Iron deficiency anemia      RECORDS OBTAINED  Records of the patients history including those obtained from primary care were reviewed and summarized in detail.        HISTORY OF PRESENT ILLNESS:  The patient is a 23 y.o.  female, referred for iron deficiency anemia in the face of heavy menses    REVIEW OF SYSTEMS:  Heavy menses    Past Medical History:   Diagnosis Date   • Anemia      Past Surgical History:   Procedure Laterality Date   • ADENOIDECTOMY  2007   • RADIAL OSTEOTOMY Right    • TONSILLECTOMY  2007       Current Outpatient Medications on File Prior to Visit   Medication Sig Dispense Refill   • FLUoxetine (PROzac) 20 MG capsule 1 capsule.     • loratadine (CLARITIN) 10 MG tablet Take 1 tablet by mouth Daily.     • norethindrone-ethinyl estradiol (MICROGESTIN) 1-20 MG-MCG per tablet Take 1 tablet by mouth Daily.     • Vraylar 1.5 MG capsule capsule Take 2 capsules by mouth Daily.       No current facility-administered medications on file prior to visit.       Allergies   Allergen Reactions   • Omnicef [Cefdinir] GI Intolerance   • Strawberry Flavor Unknown - High Severity   • Latex Rash       Social History     Socioeconomic History   • Marital status:    Tobacco Use   • Smoking status: Every Day     Types: Cigarettes     Passive exposure: Never   • Smokeless tobacco: Current   Vaping Use   • Vaping status: Every Day   • Substances:  "Nicotine   • Devices: Disposable   Substance and Sexual Activity   • Alcohol use: Yes     Comment: RARLY   • Drug use: Never   • Sexual activity: Yes     Partners: Female     Birth control/protection: Same-sex partner       Family History   Problem Relation Age of Onset   • Diabetes Paternal Grandfather    • Diabetes Paternal Grandmother    • Hypertension Paternal Uncle    • Breast cancer Neg Hx    • Ovarian cancer Neg Hx    • Uterine cancer Neg Hx        PHYSICAL EXAM:  No jaundice icterus or pallor.  No respiratory distress.    /82   Pulse 99   Temp 99.1 °F (37.3 °C)   Resp 18   Ht 162.6 cm (64\")   Wt 118 kg (260 lb)   SpO2 98%   BMI 44.63 kg/m²     ECOG score: 0           ECOG: (0) Fully Active - Able to Carry On All Pre-disease Performance Without Restriction    Lab Results   Component Value Date    HGB 8.9 (L) 07/03/2024    HCT 32.3 (L) 07/03/2024    MCV 69 (L) 07/03/2024     07/03/2024    WBC 13.3 (H) 07/03/2024    NEUTROABS 8.8 (H) 04/26/2024    LYMPHSABS 2.9 04/26/2024    MONOSABS 0.6 04/26/2024    EOSABS 0.1 04/26/2024    BASOSABS 0.1 04/26/2024     Lab Results   Component Value Date    GLUCOSE 85 04/17/2024    BUN 9 04/17/2024    CREATININE 0.80 04/17/2024     04/17/2024    K 3.9 04/17/2024     04/17/2024    CO2 22 04/17/2024    CALCIUM 9.0 04/17/2024    PROTEINTOT 7.2 03/29/2021    ALBUMIN 4.3 04/17/2024    BILITOT 0.4 04/17/2024    ALKPHOS 109 04/17/2024    AST 22 04/17/2024    ALT 21 04/17/2024         Assessment & Plan  1.  Iron deficiency with menorrhagia.  Intolerant of oral iron.  Gynecologist [place] started Microgestin for abnormal uterine bleeding.  INR, von Willebrand factor activity, von Willebrand factor antigen, PTT, factor VIII activity all normal.  Hemoglobin 7.7 in April with MCV 66.  7/3/2024 hemoglobin up to 8.9 with MCV 69 white count 13,300 likely reactive.  Ferritin 6 with iron 15 saturation 3% and gynecologist has started IV iron in [place].  " Iron deficiency anemia in a menstruating 23-year-old female does not require formal hematology oncology evaluation and her iron deficiency is being managed well by her gynecologist, Jeremias Villanueva, and [facility].  I will see her on an as-needed basis.    Total time of care reviewing past records and discussing this with patient took 35 minutes of patient care time throughout the day today.      Kvng Putnam MD    7/16/2024

## 2024-07-18 ENCOUNTER — HOSPITAL ENCOUNTER (OUTPATIENT)
Dept: INFUSION THERAPY | Facility: HOSPITAL | Age: 24
Discharge: HOME | End: 2024-07-18
Payer: COMMERCIAL

## 2024-07-18 VITALS
RESPIRATION RATE: 18 BRPM | OXYGEN SATURATION: 99 % | TEMPERATURE: 98.4 F | SYSTOLIC BLOOD PRESSURE: 127 MMHG | DIASTOLIC BLOOD PRESSURE: 83 MMHG | HEART RATE: 89 BPM

## 2024-07-18 DIAGNOSIS — D50.0 IRON DEFICIENCY ANEMIA DUE TO CHRONIC BLOOD LOSS: ICD-10-CM

## 2024-07-18 DIAGNOSIS — N93.9 ABNORMAL UTERINE BLEEDING (AUB): Primary | ICD-10-CM

## 2024-07-18 PROCEDURE — 96374 THER/PROPH/DIAG INJ IV PUSH: CPT

## 2024-07-18 PROCEDURE — 25010000002 NA FERRIC GLUC CPLX PER 12.5 MG: Performed by: OBSTETRICS & GYNECOLOGY

## 2024-07-18 PROCEDURE — 96366 THER/PROPH/DIAG IV INF ADDON: CPT

## 2024-07-18 PROCEDURE — 96365 THER/PROPH/DIAG IV INF INIT: CPT

## 2024-07-18 PROCEDURE — 25810000003 SODIUM CHLORIDE 0.9 % SOLUTION: Performed by: OBSTETRICS & GYNECOLOGY

## 2024-07-18 PROCEDURE — 96375 TX/PRO/DX INJ NEW DRUG ADDON: CPT

## 2024-07-18 PROCEDURE — 25010000002 DIPHENHYDRAMINE PER 50 MG: Performed by: OBSTETRICS & GYNECOLOGY

## 2024-07-18 RX ORDER — SODIUM CHLORIDE 9 MG/ML
20 INJECTION, SOLUTION INTRAVENOUS ONCE
Status: CANCELLED | OUTPATIENT
Start: 2024-07-25

## 2024-07-18 RX ORDER — ACETAMINOPHEN 325 MG/1
650 TABLET ORAL ONCE
Status: COMPLETED | OUTPATIENT
Start: 2024-07-18 | End: 2024-07-18

## 2024-07-18 RX ORDER — SODIUM CHLORIDE 9 MG/ML
20 INJECTION, SOLUTION INTRAVENOUS ONCE
Status: DISCONTINUED | OUTPATIENT
Start: 2024-07-18 | End: 2024-07-20 | Stop reason: HOSPADM

## 2024-07-18 RX ORDER — ACETAMINOPHEN 325 MG/1
650 TABLET ORAL ONCE
Status: CANCELLED | OUTPATIENT
Start: 2024-07-25

## 2024-07-18 RX ADMIN — SODIUM CHLORIDE 250 MG: 9 INJECTION, SOLUTION INTRAVENOUS at 09:36

## 2024-07-18 RX ADMIN — DIPHENHYDRAMINE HYDROCHLORIDE 25 MG: 50 INJECTION, SOLUTION INTRAMUSCULAR; INTRAVENOUS at 09:11

## 2024-07-18 RX ADMIN — ACETAMINOPHEN 325MG 650 MG: 325 TABLET ORAL at 08:40

## 2024-07-25 ENCOUNTER — HOSPITAL ENCOUNTER (OUTPATIENT)
Dept: INFUSION THERAPY | Facility: HOSPITAL | Age: 24
Discharge: HOME | End: 2024-07-25
Payer: COMMERCIAL

## 2024-07-25 VITALS
SYSTOLIC BLOOD PRESSURE: 134 MMHG | DIASTOLIC BLOOD PRESSURE: 97 MMHG | HEART RATE: 88 BPM | OXYGEN SATURATION: 100 % | RESPIRATION RATE: 16 BRPM | TEMPERATURE: 97.3 F

## 2024-07-25 DIAGNOSIS — D50.0 IRON DEFICIENCY ANEMIA DUE TO CHRONIC BLOOD LOSS: ICD-10-CM

## 2024-07-25 DIAGNOSIS — N93.9 ABNORMAL UTERINE BLEEDING (AUB): Primary | ICD-10-CM

## 2024-07-25 PROCEDURE — 96366 THER/PROPH/DIAG IV INF ADDON: CPT

## 2024-07-25 PROCEDURE — 25010000002 NA FERRIC GLUC CPLX PER 12.5 MG: Performed by: OBSTETRICS & GYNECOLOGY

## 2024-07-25 PROCEDURE — 25810000003 SODIUM CHLORIDE 0.9 % SOLUTION: Performed by: OBSTETRICS & GYNECOLOGY

## 2024-07-25 PROCEDURE — 96365 THER/PROPH/DIAG IV INF INIT: CPT

## 2024-07-25 PROCEDURE — 96374 THER/PROPH/DIAG INJ IV PUSH: CPT

## 2024-07-25 PROCEDURE — 25010000002 DIPHENHYDRAMINE PER 50 MG: Performed by: OBSTETRICS & GYNECOLOGY

## 2024-07-25 PROCEDURE — 96375 TX/PRO/DX INJ NEW DRUG ADDON: CPT

## 2024-07-25 RX ORDER — SODIUM CHLORIDE 9 MG/ML
20 INJECTION, SOLUTION INTRAVENOUS ONCE
Status: DISCONTINUED | OUTPATIENT
Start: 2024-07-25 | End: 2024-07-27 | Stop reason: HOSPADM

## 2024-07-25 RX ORDER — ACETAMINOPHEN 325 MG/1
650 TABLET ORAL ONCE
Status: CANCELLED | OUTPATIENT
Start: 2024-08-01

## 2024-07-25 RX ORDER — SODIUM CHLORIDE 9 MG/ML
20 INJECTION, SOLUTION INTRAVENOUS ONCE
Status: CANCELLED | OUTPATIENT
Start: 2024-08-01

## 2024-07-25 RX ORDER — ACETAMINOPHEN 325 MG/1
650 TABLET ORAL ONCE
Status: COMPLETED | OUTPATIENT
Start: 2024-07-25 | End: 2024-07-25

## 2024-07-25 RX ADMIN — DIPHENHYDRAMINE HYDROCHLORIDE 25 MG: 50 INJECTION, SOLUTION INTRAMUSCULAR; INTRAVENOUS at 10:20

## 2024-07-25 RX ADMIN — SODIUM CHLORIDE 250 MG: 9 INJECTION, SOLUTION INTRAVENOUS at 11:06

## 2024-07-25 RX ADMIN — ACETAMINOPHEN 325MG 650 MG: 325 TABLET ORAL at 09:57

## 2024-08-01 ENCOUNTER — HOSPITAL ENCOUNTER (OUTPATIENT)
Dept: INFUSION THERAPY | Facility: HOSPITAL | Age: 24
Discharge: HOME | End: 2024-08-01
Payer: COMMERCIAL

## 2024-08-01 VITALS
DIASTOLIC BLOOD PRESSURE: 84 MMHG | SYSTOLIC BLOOD PRESSURE: 121 MMHG | RESPIRATION RATE: 18 BRPM | HEART RATE: 85 BPM | TEMPERATURE: 98 F | OXYGEN SATURATION: 100 %

## 2024-08-01 DIAGNOSIS — N93.9 ABNORMAL UTERINE BLEEDING (AUB): Primary | ICD-10-CM

## 2024-08-01 DIAGNOSIS — D50.0 IRON DEFICIENCY ANEMIA DUE TO CHRONIC BLOOD LOSS: ICD-10-CM

## 2024-08-01 PROCEDURE — 96365 THER/PROPH/DIAG IV INF INIT: CPT

## 2024-08-01 PROCEDURE — 25010000002 NA FERRIC GLUC CPLX PER 12.5 MG: Performed by: OBSTETRICS & GYNECOLOGY

## 2024-08-01 PROCEDURE — 25810000003 SODIUM CHLORIDE 0.9 % SOLUTION: Performed by: OBSTETRICS & GYNECOLOGY

## 2024-08-01 PROCEDURE — 96366 THER/PROPH/DIAG IV INF ADDON: CPT

## 2024-08-01 PROCEDURE — 25010000002 DIPHENHYDRAMINE PER 50 MG: Performed by: OBSTETRICS & GYNECOLOGY

## 2024-08-01 PROCEDURE — 96374 THER/PROPH/DIAG INJ IV PUSH: CPT

## 2024-08-01 PROCEDURE — 96375 TX/PRO/DX INJ NEW DRUG ADDON: CPT

## 2024-08-01 RX ORDER — SODIUM CHLORIDE 9 MG/ML
20 INJECTION, SOLUTION INTRAVENOUS ONCE
Status: DISCONTINUED | OUTPATIENT
Start: 2024-08-01 | End: 2024-08-03 | Stop reason: HOSPADM

## 2024-08-01 RX ORDER — ACETAMINOPHEN 325 MG/1
650 TABLET ORAL ONCE
Status: COMPLETED | OUTPATIENT
Start: 2024-08-01 | End: 2024-08-01

## 2024-08-01 RX ORDER — NORETHINDRONE AND ETHINYL ESTRADIOL 1 MG-35MCG
1 KIT ORAL DAILY
COMMUNITY
Start: 2024-07-03 | End: 2024-08-01 | Stop reason: SDUPTHER

## 2024-08-01 RX ORDER — ACETAMINOPHEN 325 MG/1
650 TABLET ORAL ONCE
Status: CANCELLED | OUTPATIENT
Start: 2024-08-01

## 2024-08-01 RX ORDER — SODIUM CHLORIDE 9 MG/ML
20 INJECTION, SOLUTION INTRAVENOUS ONCE
Status: CANCELLED | OUTPATIENT
Start: 2024-08-01

## 2024-08-01 RX ADMIN — DIPHENHYDRAMINE HYDROCHLORIDE 25 MG: 50 INJECTION, SOLUTION INTRAMUSCULAR; INTRAVENOUS at 10:13

## 2024-08-01 RX ADMIN — ACETAMINOPHEN 325MG 650 MG: 325 TABLET ORAL at 10:14

## 2024-08-01 RX ADMIN — SODIUM CHLORIDE 250 MG: 9 INJECTION, SOLUTION INTRAVENOUS at 10:53

## 2025-05-05 ENCOUNTER — OFFICE VISIT (OUTPATIENT)
Dept: FAMILY MEDICINE CLINIC | Facility: CLINIC | Age: 25
End: 2025-05-05
Payer: COMMERCIAL

## 2025-05-05 VITALS
WEIGHT: 273.19 LBS | OXYGEN SATURATION: 98 % | BODY MASS INDEX: 46.64 KG/M2 | HEIGHT: 64 IN | HEART RATE: 99 BPM | SYSTOLIC BLOOD PRESSURE: 130 MMHG | DIASTOLIC BLOOD PRESSURE: 84 MMHG

## 2025-05-05 DIAGNOSIS — D50.0 IRON DEFICIENCY ANEMIA DUE TO CHRONIC BLOOD LOSS: ICD-10-CM

## 2025-05-05 DIAGNOSIS — R82.90 NONSPECIFIC FINDING ON EXAMINATION OF URINE: ICD-10-CM

## 2025-05-05 DIAGNOSIS — Z13.220 SCREENING CHOLESTEROL LEVEL: ICD-10-CM

## 2025-05-05 DIAGNOSIS — N92.0 MENORRHAGIA WITH REGULAR CYCLE: ICD-10-CM

## 2025-05-05 DIAGNOSIS — E66.01 MORBID OBESITY WITH BMI OF 40.0-44.9, ADULT: ICD-10-CM

## 2025-05-05 DIAGNOSIS — Z00.00 ANNUAL PHYSICAL EXAM: Primary | ICD-10-CM

## 2025-05-05 DIAGNOSIS — R73.03 PREDIABETES: ICD-10-CM

## 2025-05-05 LAB
BILIRUB BLD-MCNC: NEGATIVE MG/DL
CLARITY, POC: CLEAR
COLOR UR: YELLOW
EXPIRATION DATE: ABNORMAL
GLUCOSE UR STRIP-MCNC: NEGATIVE MG/DL
KETONES UR QL: NEGATIVE
LEUKOCYTE EST, POC: NEGATIVE
Lab: ABNORMAL
NITRITE UR-MCNC: NEGATIVE MG/ML
PH UR: 6 [PH] (ref 5–8)
PROT UR STRIP-MCNC: NEGATIVE MG/DL
RBC # UR STRIP: ABNORMAL /UL
SP GR UR: 1.03 (ref 1–1.03)
UROBILINOGEN UR QL: NORMAL

## 2025-05-05 RX ORDER — CARIPRAZINE 3 MG/1
CAPSULE, GELATIN COATED ORAL
COMMUNITY

## 2025-05-05 NOTE — ASSESSMENT & PLAN NOTE
Labs drawn.  UA completed.  Proper diet and exercise plan discussed and encouraged.  Annual dental and eye exams encouraged.  She will continue to follow-up with her OB/GYN and psychiatrist.  Encouraged to stop vaping.  Goal blood pressure less than 140/90.  Let me know if gets to or above that.  PHQ-9 completed and discussed.  No thoughts of suicide or hurting herself or anyone else.  Tetanus vaccine up-to-date.  Denies immunizations and encouraged to discuss COVID and pneumonia vaccines further with her pharmacist.  Risk of meds discussed and understood.  Education provided.  Return to clinic or ED with any issues or concerns.

## 2025-05-05 NOTE — PROGRESS NOTES
Chief Complaint  Annual Exam    Subjective          Laura Dexter presents to [facility]  History of Present Illness  History of Present Illness  The patient is a 24-year-old female who presents for an annual exam. She has a history of prediabetes, iron deficiency anemia, irregular periods, bipolar disorder, and allergies. She is currently on Claritin, Microgestin, and Vraylar. She does not smoke or consume alcohol. She tries to watch her diet and stays active. She reports no dizziness, headache, chest pain, chest pressure, shortness of breath, trouble breathing, or urinary or bowel issues. Her tetanus vaccine is up to date. Her last Pap smear was completed in 07/2024 by her gynecologist, Dr. Villanueva. She does vape. Overall, she states she is doing fairly well.    She has a history of anemia due to heavy menstrual periods and has previously received iron infusions, with the most recent one administered in 08/2024. She reports feeling better after the iron infusions. She continues to menstruate and states that her periods are very heavy.  States since she has not received the iron infusions in a while she does feel a little bit more rundown than usual.    She has been prescribed birth control due to the heavy periods but has not initiated its use due to concerns about potential interactions with her vaping habit. She uses tobacco-containing vapes and expresses a desire to quit eventually. She has a scheduled appointment with Dr. Villanueva in 07/2025.    She has been under the care of Ofe Coyne in [place] for Bipolar disorder for approximately 8 to 9 months, with monthly follow-ups. She is currently on Vraylar for these conditions. Despite being advised to undergo therapy, she has not yet started.    She uses corrective eyewear and it has been over a year since her last ophthalmological examination. She maintains regular dental check-ups.    SOCIAL HISTORY  She does not smoke or  "consume alcohol. She uses tobacco-containing vapes.    FAMILY HISTORY  She does not have a family history of colon cancer.    Patient Care Team:  Brannon Montiel APRN as PCP - General (Nurse Practitioner)  Jeremias Villanueva MD as Obstetrician (Obstetrics and Gynecology)  Ofe Coyne APRN (Nurse Practitioner)     Objective   Vital Signs:   /84   Pulse 99   Ht 162.6 cm (64\")   Wt 124 kg (273 lb 3 oz)   SpO2 98%   BMI 46.89 kg/m²     Body mass index is 46.89 kg/m².    Review of Systems   Constitutional: Negative.    HENT: Negative.     Eyes: Negative.    Respiratory: Negative.     Cardiovascular: Negative.    Gastrointestinal: Negative.    Genitourinary:  Positive for menstrual problem. Negative for decreased urine volume, dysuria, flank pain, frequency, hematuria, pelvic pain, pelvic pressure, urgency and urinary incontinence.   Musculoskeletal: Negative.    Skin: Negative.    Neurological: Negative.    Psychiatric/Behavioral: Negative.         Past History:  Medical History: has a past medical history of Abnormal uterine bleeding, Anemia, Anxiety, and Mood disorder.   Surgical History: has a past surgical history that includes Tonsillectomy (2007); Adenoidectomy (2007); and Radial Osteotomy (Right).   Family History: family history includes Diabetes in her paternal grandfather and paternal grandmother; Hypertension in her paternal uncle.   Social History: reports that she has never smoked. She has never been exposed to tobacco smoke. She uses smokeless tobacco. She reports current alcohol use. She reports that she does not use drugs.    PHQ-2 Depression Screening  Little interest or pleasure in doing things? Not at all   Feeling down, depressed, or hopeless? Not at all   PHQ-2 Total Score 0       PHQ-9 Depression Screening  Little interest or pleasure in doing things? Not at all   Feeling down, depressed, or hopeless? Not at all   PHQ-2 Total Score 0   Trouble falling or staying asleep, or " sleeping too much?     Feeling tired or having little energy?     Poor appetite or overeating?     Feeling bad about yourself - or that you are a failure or have let yourself or your family down?     Trouble concentrating on things, such as reading the newspaper or watching television?     Moving or speaking so slowly that other people could have noticed? Or the opposite - being so fidgety or restless that you have been moving around a lot more than usual?     Thoughts that you would be better off dead, or of hurting yourself in some way?     PHQ-9 Total Score     If you checked off any problems, how difficult have these problems made it for you to do your work, take care of things at home, or get along with other people? Not difficult at all        PHQ-9 Total Score:        Patient screened positive for depression based on a PHQ-9 score of  on . Follow-up recommendations include:          Current Outpatient Medications:     loratadine (CLARITIN) 10 MG tablet, Take 1 tablet by mouth Daily., Disp: , Rfl:     norethindrone-ethinyl estradiol (MICROGESTIN) 1-20 MG-MCG per tablet, Take 1 tablet by mouth Daily., Disp: , Rfl:     Vraylar 3 MG capsule capsule, , Disp: , Rfl:    (Not in a hospital admission)     Allergies: Omnicef [cefdinir], Strawberry flavoring agent (non-screening), and Latex    Physical Exam  Constitutional:       Appearance: Normal appearance.   HENT:      Head: Normocephalic.      Right Ear: Tympanic membrane, ear canal and external ear normal.      Left Ear: Tympanic membrane, ear canal and external ear normal.      Nose: Nose normal.      Mouth/Throat:      Mouth: Mucous membranes are moist.      Pharynx: Oropharynx is clear.   Eyes:      Extraocular Movements: Extraocular movements intact.      Conjunctiva/sclera: Conjunctivae normal.      Pupils: Pupils are equal, round, and reactive to light.   Cardiovascular:      Rate and Rhythm: Normal rate and regular rhythm.      Heart sounds: Normal heart  sounds.   Pulmonary:      Effort: Pulmonary effort is normal.      Breath sounds: Normal breath sounds.   Abdominal:      General: Abdomen is flat. Bowel sounds are normal.      Palpations: Abdomen is soft.   Genitourinary:     Comments: Denied   Musculoskeletal:         General: Normal range of motion.      Cervical back: Normal range of motion.   Skin:     General: Skin is warm.      Findings: No erythema or rash.   Neurological:      General: No focal deficit present.      Mental Status: She is alert and oriented to person, place, and time. Mental status is at baseline.   Psychiatric:         Mood and Affect: Mood normal.         Behavior: Behavior normal.         Thought Content: Thought content normal.         Judgment: Judgment normal.        Physical Exam  Mouth/Throat: Mucous membranes moist, no erythema, no exudate  Respiratory: Clear to auscultation, no wheezing, rales or rhonchi  Cardiovascular: Regular rate and rhythm, no murmurs, rubs, or gallops    Result Review :          Results               Assessment and Plan    Diagnoses and all orders for this visit:    1. Annual physical exam (Primary)  Assessment & Plan:  Labs drawn.  UA completed.  Proper diet and exercise plan discussed and encouraged.  Annual dental and eye exams encouraged.  She will continue to follow-up with her OB/GYN and psychiatrist.  Encouraged to stop vaping.  Goal blood pressure less than 140/90.  Let me know if gets to or above that.  PHQ-9 completed and discussed.  No thoughts of suicide or hurting herself or anyone else.  Tetanus vaccine up-to-date.  Denies immunizations and encouraged to discuss COVID and pneumonia vaccines further with her pharmacist.  Risk of meds discussed and understood.  Education provided.  Return to clinic or ED with any issues or concerns.    Orders:  -     CBC & Differential  -     Comprehensive Metabolic Panel  -     TSH Rfx On Abnormal To Free T4  -     POC Urinalysis Dipstick, Automated;  Future    2. Prediabetes  -     Hemoglobin A1c    3. Morbid obesity with BMI of 40.0-44.9, adult    4. Menorrhagia with regular cycle    5. Iron deficiency anemia due to chronic blood loss  -     CBC & Differential  -     Ferritin  -     Vitamin B12  -     Iron Profile    6. Screening cholesterol level  -     Lipid Panel      Assessment & Plan  1. Iron Deficiency Anemia.  - History of iron deficiency anemia likely due to heavy menstrual periods.  - Last iron infusion was in 08/2024.  - Comprehensive blood workup will be conducted today to assess iron levels.  - If results indicate a significant imbalance, reinitiation of iron supplementation will be discussed.  Proper diet and exercise plan discussed and encouraged.  Encouraged her to also go ahead and have close follow-up with her gynecologist.  Encouraged to stop vaping.  She is aware that birth control pill would ideally help regulate her menstrual cycle but she wants to wait and discuss further again with her gynecologist.    2. Anxiety and Depression.  - On Vraylar for about 8 to 9 months.  - Prescribed by Ofe Coyne in [place].  - Monthly follow-ups for this condition.  - Does not currently engage in therapy but is supposed to.    3. Irregular Periods.  - Has irregular periods and has not been taking prescribed birth control due to concerns about vaping and potential blood clot risks.  - Advised to discuss this with gynecologist, Dr. Villanueva, during appointment in 07/2025.    4. Allergies.  - Currently on Claritin for allergies.  - No reported issues with current allergy management.  - Continues to stay active and watch diet despite allergies.  - No dizziness, headache, chest pain, chest pressure, shortness of breath, or trouble breathing.            Class 3 Severe Obesity (BMI >=40). Obesity-related health conditions include the following: none. Obesity is unchanged. BMI is is above average; BMI management plan is completed. We discussed portion control  and increasing exercise.       Follow Up   Return in about 1 year (around 5/5/2026), or if symptoms worsen or fail to improve.  Patient was given instructions and counseling regarding her condition or for health maintenance advice. Please see specific information pulled into the AVS if appropriate.     Patient or patient representative verbalized consent for the use of Ambient Listening during the visit with  ELEAZAR Pena for chart documentation. 5/5/2025  08:29 EDT    ELEAZAR Pena

## 2025-05-06 LAB
ALBUMIN SERPL-MCNC: 4.5 G/DL (ref 4–5)
ALP SERPL-CCNC: 135 IU/L (ref 44–121)
ALT SERPL-CCNC: 25 IU/L (ref 0–32)
AST SERPL-CCNC: 18 IU/L (ref 0–40)
BASOPHILS # BLD AUTO: 0.1 X10E3/UL (ref 0–0.2)
BASOPHILS NFR BLD AUTO: 1 %
BILIRUB SERPL-MCNC: 0.4 MG/DL (ref 0–1.2)
BUN SERPL-MCNC: 8 MG/DL (ref 6–20)
BUN/CREAT SERPL: 9 (ref 9–23)
CALCIUM SERPL-MCNC: 9.3 MG/DL (ref 8.7–10.2)
CHLORIDE SERPL-SCNC: 102 MMOL/L (ref 96–106)
CHOLEST SERPL-MCNC: 108 MG/DL (ref 100–199)
CO2 SERPL-SCNC: 19 MMOL/L (ref 20–29)
CREAT SERPL-MCNC: 0.86 MG/DL (ref 0.57–1)
EGFRCR SERPLBLD CKD-EPI 2021: 98 ML/MIN/1.73
EOSINOPHIL # BLD AUTO: 0.2 X10E3/UL (ref 0–0.4)
EOSINOPHIL NFR BLD AUTO: 2 %
ERYTHROCYTE [DISTWIDTH] IN BLOOD BY AUTOMATED COUNT: 17.2 % (ref 11.7–15.4)
FERRITIN SERPL-MCNC: 8 NG/ML (ref 15–150)
GLOBULIN SER CALC-MCNC: 2.6 G/DL (ref 1.5–4.5)
GLUCOSE SERPL-MCNC: 81 MG/DL (ref 70–99)
HBA1C MFR BLD: 5.6 % (ref 4.8–5.6)
HCT VFR BLD AUTO: 36.8 % (ref 34–46.6)
HDLC SERPL-MCNC: 46 MG/DL
HGB BLD-MCNC: 10.5 G/DL (ref 11.1–15.9)
IMM GRANULOCYTES # BLD AUTO: 0.1 X10E3/UL (ref 0–0.1)
IMM GRANULOCYTES NFR BLD AUTO: 1 %
IRON SATN MFR SERPL: 3 % (ref 15–55)
IRON SERPL-MCNC: 15 UG/DL (ref 27–159)
LDLC SERPL CALC-MCNC: 43 MG/DL (ref 0–99)
LYMPHOCYTES # BLD AUTO: 3.3 X10E3/UL (ref 0.7–3.1)
LYMPHOCYTES NFR BLD AUTO: 23 %
MCH RBC QN AUTO: 20.8 PG (ref 26.6–33)
MCHC RBC AUTO-ENTMCNC: 28.5 G/DL (ref 31.5–35.7)
MCV RBC AUTO: 73 FL (ref 79–97)
MONOCYTES # BLD AUTO: 0.7 X10E3/UL (ref 0.1–0.9)
MONOCYTES NFR BLD AUTO: 5 %
NEUTROPHILS # BLD AUTO: 10.2 X10E3/UL (ref 1.4–7)
NEUTROPHILS NFR BLD AUTO: 68 %
PLATELET # BLD AUTO: 471 X10E3/UL (ref 150–450)
POTASSIUM SERPL-SCNC: 4.1 MMOL/L (ref 3.5–5.2)
PROT SERPL-MCNC: 7.1 G/DL (ref 6–8.5)
RBC # BLD AUTO: 5.06 X10E6/UL (ref 3.77–5.28)
SODIUM SERPL-SCNC: 140 MMOL/L (ref 134–144)
TIBC SERPL-MCNC: 442 UG/DL (ref 250–450)
TRIGL SERPL-MCNC: 104 MG/DL (ref 0–149)
TSH SERPL DL<=0.005 MIU/L-ACNC: 2.48 UIU/ML (ref 0.45–4.5)
UIBC SERPL-MCNC: 427 UG/DL (ref 131–425)
VIT B12 SERPL-MCNC: 598 PG/ML (ref 232–1245)
VLDLC SERPL CALC-MCNC: 19 MG/DL (ref 5–40)
WBC # BLD AUTO: 14.7 X10E3/UL (ref 3.4–10.8)

## 2025-05-07 ENCOUNTER — RESULTS FOLLOW-UP (OUTPATIENT)
Dept: FAMILY MEDICINE CLINIC | Facility: CLINIC | Age: 25
End: 2025-05-07
Payer: COMMERCIAL

## 2025-05-07 DIAGNOSIS — D72.829 LEUKOCYTOSIS, UNSPECIFIED TYPE: ICD-10-CM

## 2025-05-07 DIAGNOSIS — R79.89 HIGH PLATELET COUNT: ICD-10-CM

## 2025-05-07 DIAGNOSIS — E61.1 IRON DEFICIENCY: Primary | ICD-10-CM

## 2025-05-07 DIAGNOSIS — D72.828 NEUTROPHILIA: ICD-10-CM

## 2025-05-07 LAB
BACTERIA UR CULT: NORMAL
BACTERIA UR CULT: NORMAL

## 2025-05-28 ENCOUNTER — OFFICE VISIT (OUTPATIENT)
Dept: ONCOLOGY | Facility: CLINIC | Age: 25
End: 2025-05-28
Payer: COMMERCIAL

## 2025-05-28 VITALS
TEMPERATURE: 98.6 F | HEIGHT: 64 IN | BODY MASS INDEX: 46.61 KG/M2 | DIASTOLIC BLOOD PRESSURE: 87 MMHG | OXYGEN SATURATION: 98 % | HEART RATE: 107 BPM | SYSTOLIC BLOOD PRESSURE: 136 MMHG | WEIGHT: 273 LBS | RESPIRATION RATE: 18 BRPM

## 2025-05-28 DIAGNOSIS — K90.9 IRON MALABSORPTION: Primary | ICD-10-CM

## 2025-05-28 DIAGNOSIS — D50.0 IRON DEFICIENCY ANEMIA DUE TO CHRONIC BLOOD LOSS: ICD-10-CM

## 2025-05-28 PROCEDURE — 99214 OFFICE O/P EST MOD 30 MIN: CPT | Performed by: NURSE PRACTITIONER

## 2025-05-28 RX ORDER — VENLAFAXINE HCL 37.5 MG
CAPSULE, EXT RELEASE 24 HR ORAL
COMMUNITY

## 2025-05-28 RX ORDER — FAMOTIDINE 10 MG/ML
20 INJECTION, SOLUTION INTRAVENOUS AS NEEDED
OUTPATIENT
Start: 2025-06-11

## 2025-05-28 RX ORDER — DIPHENHYDRAMINE HYDROCHLORIDE 50 MG/ML
50 INJECTION, SOLUTION INTRAMUSCULAR; INTRAVENOUS AS NEEDED
OUTPATIENT
Start: 2025-06-11

## 2025-05-28 RX ORDER — SODIUM CHLORIDE 9 MG/ML
20 INJECTION, SOLUTION INTRAVENOUS ONCE
OUTPATIENT
Start: 2025-06-04

## 2025-05-28 RX ORDER — HYDROCORTISONE SODIUM SUCCINATE 100 MG/2ML
100 INJECTION INTRAMUSCULAR; INTRAVENOUS AS NEEDED
OUTPATIENT
Start: 2025-06-11

## 2025-05-28 RX ORDER — HYDROCORTISONE SODIUM SUCCINATE 100 MG/2ML
100 INJECTION INTRAMUSCULAR; INTRAVENOUS AS NEEDED
OUTPATIENT
Start: 2025-06-04

## 2025-05-28 RX ORDER — FAMOTIDINE 10 MG/ML
20 INJECTION, SOLUTION INTRAVENOUS AS NEEDED
OUTPATIENT
Start: 2025-06-04

## 2025-05-28 RX ORDER — SODIUM CHLORIDE 9 MG/ML
20 INJECTION, SOLUTION INTRAVENOUS ONCE
OUTPATIENT
Start: 2025-06-11

## 2025-05-28 RX ORDER — DIPHENHYDRAMINE HYDROCHLORIDE 50 MG/ML
50 INJECTION, SOLUTION INTRAMUSCULAR; INTRAVENOUS AS NEEDED
OUTPATIENT
Start: 2025-06-04

## 2025-05-28 NOTE — PROGRESS NOTES
CHIEF COMPLAINT: Anemia    Problem List:  Oncology/Hematology History Overview Note   1.  Iron deficiency with menorrhagia.  Intolerant of oral iron.  Gynecologist [place] started Microgestin for abnormal uterine bleeding.  INR, von Willebrand factor activity, von Willebrand factor antigen, PTT, factor VIII activity all normal.  Hemoglobin 7.7 in April with MCV 66.  7/3/2024 hemoglobin up to 8.9 with MCV 69 white count 13,300 likely reactive.  Ferritin 6 with iron 15 saturation 3% and gynecologist has started IV iron in [place].  Iron deficiency anemia in a menstruating 23-year-old female does not require formal hematology oncology evaluation and her iron deficiency is being managed well by her gynecologist, Jeremias Villanueva, and [place]. Released PRN  2.  Intolerant to oral iron  3.  Menorrhagia  4.  Obesity, BMI >40  5.  Nicotine dependence, vaping    -5/5/2025 Ferritin 8, serum iron 15, iron saturation 3%, TIBC 442. CBC WBC 14.7, hemoglobin 10.5, hematocrit 36.8%, MCV 73, MCHC 28.5, platelet count 471,000.   -5/7/2025 Pt referred back to hematology         Iron deficiency anemia due to chronic blood loss   6/5/2024 Initial Diagnosis    Iron deficiency anemia due to chronic blood loss     6/4/2025 -  Chemotherapy    OP SUPPORTIVE Ferumoxytol 510 mg         HISTORY OF PRESENT ILLNESS:  The patient is a 24 y.o. female, here for follow up on management of iron deficiency anemia secondary to menorrhagia, intolerant of oral iron.  Laura returns to our office for discussion of parenteral iron.  She is intolerant of oral iron.  She last received parenteral iron about 1 year ago in [place].  She states that she had no complications other than for some mild nausea.  Continues to have abnormal uterine bleeding.  She was prescribed birth control pills however she has been hesitant to take these as she is concerned about increased risk of blood clot.  She states that she has a family history of blood clot and she  "continues to vape.  Has an appointment with her gynecologist in July.    Past Medical History:   Diagnosis Date    Abnormal uterine bleeding     Anemia     Anxiety     Mood disorder      Past Surgical History:   Procedure Laterality Date    ADENOIDECTOMY  2007    RADIAL OSTEOTOMY Right     TONSILLECTOMY  2007       Allergies   Allergen Reactions    Omnicef [Cefdinir] GI Intolerance    Strawberry Flavoring Agent (Non-Screening) Unknown - High Severity    Latex Rash       Family History and Social History reviewed and changed as necessary    REVIEW OF SYSTEM:   Mild fatigue  Intermittent nausea    PHYSICAL EXAM:  Well-developed, well-nourished appearing female in no distress, here with her wife today  No jaundice, no pallor or icterus  Respirations regular and unlabored, lungs clear to auscultation bilaterally    Vitals:    05/28/25 1101   BP: 136/87   Pulse: 107   Resp: 18   Temp: 98.6 °F (37 °C)   SpO2: 98%   Weight: 124 kg (273 lb)   Height: 162.6 cm (64\")     Vitals:    05/28/25 1101   PainSc: 0-No pain          ECOG score: 0           Vitals reviewed.  Labs reviewed    ECOG: (0) Fully Active - Able to Carry On All Pre-disease Performance Without Restriction    Lab Results   Component Value Date    HGB 10.5 (L) 05/05/2025    HCT 36.8 05/05/2025    MCV 73 (L) 05/05/2025     (H) 05/05/2025    WBC 14.7 (H) 05/05/2025    NEUTROABS 10.2 (H) 05/05/2025    LYMPHSABS 3.3 (H) 05/05/2025    MONOSABS 0.7 05/05/2025    EOSABS 0.2 05/05/2025    BASOSABS 0.1 05/05/2025       Lab Results   Component Value Date    GLUCOSE 81 05/05/2025    BUN 8 05/05/2025    CREATININE 0.86 05/05/2025     05/05/2025    K 4.1 05/05/2025     05/05/2025    CO2 19 (L) 05/05/2025    CALCIUM 9.3 05/05/2025    PROTEINTOT 7.1 05/05/2025    ALBUMIN 4.5 05/05/2025    BILITOT 0.4 05/05/2025    ALKPHOS 135 (H) 05/05/2025    AST 18 05/05/2025    ALT 25 05/05/2025             ASSESSMENT & PLAN:    1.  Iron deficiency with menorrhagia.  " Intolerant of oral iron.  Gynecologist [place] started Microgestin for abnormal uterine bleeding.  INR, von Willebrand factor activity, von Willebrand factor antigen, PTT, factor VIII activity all normal.  Hemoglobin 7.7 in April with MCV 66.  7/3/2024 hemoglobin up to 8.9 with MCV 69 white count 13,300 likely reactive.  Ferritin 6 with iron 15 saturation 3% and gynecologist has started IV iron in [place].  Iron deficiency anemia in a menstruating 23-year-old female does not require formal hematology oncology evaluation and her iron deficiency is being managed well by her gynecologist, Jeremias Villanueva, and Jasmina. Released PRN  2.  Intolerant to oral iron  3.  Menorrhagia  4.  Obesity, BMI >40  5.  Nicotine dependence, vaping    Discussion: With ongoing menorrhagia she continues to have iron deficiency.  Labs from 5/5/2025 show ferritin is low at 8 with serum iron of 15, iron saturation 3%, TIBC is normal at 442.  Hemoglobin was 10.5 with hematocrit 36.8%, MCV 73, MCHC 28.5, platelet count mildly elevated at 471,000 which would be consistent with iron deficiency.  She last received parenteral iron about 1 year ago.  She is intolerant of oral iron.  We will get her set up for Feraheme 510 mg weekly x 2.  We discussed again and she understands the potential for infusion reaction including anaphylaxis with parenteral iron and wishes to proceed.  She was prescribed birth control pills but is concerned about her risk of blood clot due to family history and ongoing vaping.  We discussed the importance of stopping nicotine products/vaping.  We also discussed her body habitus with BMI greater than 40 puts her at some risk.  She will discuss with her gynecologist alternative treatments for menorrhagia if she does not want to take birth control.  I told her that it is not an absolute contraindication to take but she has to make the decision and weigh the risks versus benefits.  I will plan on seeing her back in 4 months  for follow-up with repeat CBC, ferritin and iron profile prior to return.  Her need for parenteral iron will depend on ongoing blood loss but overall she has done pretty good in her response to previous parenteral iron as it has been a year.    I spent 32 minutes caring for Laura on this date of service. This time includes time spent by me in the following activities: preparing for the visit, reviewing tests, obtaining and/or reviewing a separately obtained history, performing a medically appropriate examination and/or evaluation, counseling and educating the patient/family/caregiver, ordering medications, tests, or procedures, documenting information in the medical record, independently interpreting results and communicating that information with the patient/family/caregiver, and care coordination.     Jojo Del Toro, APRN    05/28/2025

## 2025-06-04 ENCOUNTER — INFUSION (OUTPATIENT)
Dept: ONCOLOGY | Facility: HOSPITAL | Age: 25
End: 2025-06-04
Payer: COMMERCIAL

## 2025-06-04 VITALS
BODY MASS INDEX: 46.31 KG/M2 | HEART RATE: 89 BPM | RESPIRATION RATE: 18 BRPM | SYSTOLIC BLOOD PRESSURE: 114 MMHG | WEIGHT: 269.8 LBS | TEMPERATURE: 97.9 F | DIASTOLIC BLOOD PRESSURE: 79 MMHG

## 2025-06-04 DIAGNOSIS — D50.0 IRON DEFICIENCY ANEMIA DUE TO CHRONIC BLOOD LOSS: ICD-10-CM

## 2025-06-04 DIAGNOSIS — K90.9 IRON MALABSORPTION: Primary | ICD-10-CM

## 2025-06-04 PROCEDURE — 25010000002 FERUMOXYTOL 510 MG/17ML SOLUTION 17 ML VIAL: Performed by: NURSE PRACTITIONER

## 2025-06-04 PROCEDURE — 96374 THER/PROPH/DIAG INJ IV PUSH: CPT

## 2025-06-04 PROCEDURE — 96365 THER/PROPH/DIAG IV INF INIT: CPT

## 2025-06-04 PROCEDURE — 25810000003 SODIUM CHLORIDE 0.9 % SOLUTION: Performed by: NURSE PRACTITIONER

## 2025-06-04 RX ORDER — SODIUM CHLORIDE 9 MG/ML
20 INJECTION, SOLUTION INTRAVENOUS ONCE
Status: COMPLETED | OUTPATIENT
Start: 2025-06-04 | End: 2025-06-04

## 2025-06-04 RX ORDER — DIPHENHYDRAMINE HYDROCHLORIDE 50 MG/ML
50 INJECTION, SOLUTION INTRAMUSCULAR; INTRAVENOUS AS NEEDED
Status: DISCONTINUED | OUTPATIENT
Start: 2025-06-04 | End: 2025-06-04 | Stop reason: HOSPADM

## 2025-06-04 RX ORDER — FAMOTIDINE 10 MG/ML
20 INJECTION, SOLUTION INTRAVENOUS AS NEEDED
Status: DISCONTINUED | OUTPATIENT
Start: 2025-06-04 | End: 2025-06-04 | Stop reason: HOSPADM

## 2025-06-04 RX ORDER — HYDROCORTISONE SODIUM SUCCINATE 100 MG/2ML
100 INJECTION INTRAMUSCULAR; INTRAVENOUS AS NEEDED
Status: DISCONTINUED | OUTPATIENT
Start: 2025-06-04 | End: 2025-06-04 | Stop reason: HOSPADM

## 2025-06-04 RX ADMIN — FERUMOXYTOL 510 MG: 510 INJECTION INTRAVENOUS at 09:28

## 2025-06-04 RX ADMIN — SODIUM CHLORIDE 20 ML/HR: 9 INJECTION, SOLUTION INTRAVENOUS at 09:25

## 2025-06-11 ENCOUNTER — INFUSION (OUTPATIENT)
Dept: ONCOLOGY | Facility: HOSPITAL | Age: 25
End: 2025-06-11
Payer: COMMERCIAL

## 2025-06-11 VITALS
BODY MASS INDEX: 46.07 KG/M2 | WEIGHT: 268.4 LBS | SYSTOLIC BLOOD PRESSURE: 109 MMHG | HEART RATE: 88 BPM | TEMPERATURE: 97.2 F | DIASTOLIC BLOOD PRESSURE: 73 MMHG | RESPIRATION RATE: 18 BRPM

## 2025-06-11 DIAGNOSIS — K90.9 IRON MALABSORPTION: Primary | ICD-10-CM

## 2025-06-11 DIAGNOSIS — D50.0 IRON DEFICIENCY ANEMIA DUE TO CHRONIC BLOOD LOSS: ICD-10-CM

## 2025-06-11 PROCEDURE — 25010000002 FERUMOXYTOL 510 MG/17ML SOLUTION 17 ML VIAL: Performed by: NURSE PRACTITIONER

## 2025-06-11 PROCEDURE — 96365 THER/PROPH/DIAG IV INF INIT: CPT

## 2025-06-11 PROCEDURE — 96374 THER/PROPH/DIAG INJ IV PUSH: CPT

## 2025-06-11 PROCEDURE — 25810000003 SODIUM CHLORIDE 0.9 % SOLUTION: Performed by: NURSE PRACTITIONER

## 2025-06-11 RX ORDER — HYDROCORTISONE SODIUM SUCCINATE 100 MG/2ML
100 INJECTION INTRAMUSCULAR; INTRAVENOUS AS NEEDED
Status: DISCONTINUED | OUTPATIENT
Start: 2025-06-11 | End: 2025-06-11 | Stop reason: HOSPADM

## 2025-06-11 RX ORDER — DIPHENHYDRAMINE HYDROCHLORIDE 50 MG/ML
50 INJECTION, SOLUTION INTRAMUSCULAR; INTRAVENOUS AS NEEDED
Status: DISCONTINUED | OUTPATIENT
Start: 2025-06-11 | End: 2025-06-11 | Stop reason: HOSPADM

## 2025-06-11 RX ORDER — SODIUM CHLORIDE 9 MG/ML
20 INJECTION, SOLUTION INTRAVENOUS ONCE
Status: COMPLETED | OUTPATIENT
Start: 2025-06-11 | End: 2025-06-11

## 2025-06-11 RX ORDER — FAMOTIDINE 10 MG/ML
20 INJECTION, SOLUTION INTRAVENOUS AS NEEDED
Status: DISCONTINUED | OUTPATIENT
Start: 2025-06-11 | End: 2025-06-11 | Stop reason: HOSPADM

## 2025-06-11 RX ADMIN — FERUMOXYTOL 510 MG: 510 INJECTION INTRAVENOUS at 09:15

## 2025-06-11 RX ADMIN — SODIUM CHLORIDE 20 ML/HR: 9 INJECTION, SOLUTION INTRAVENOUS at 09:15

## 2025-06-25 ENCOUNTER — OFFICE VISIT (OUTPATIENT)
Dept: FAMILY MEDICINE CLINIC | Facility: CLINIC | Age: 25
End: 2025-06-25
Payer: COMMERCIAL

## 2025-06-25 VITALS
HEART RATE: 91 BPM | WEIGHT: 269.25 LBS | OXYGEN SATURATION: 100 % | SYSTOLIC BLOOD PRESSURE: 124 MMHG | DIASTOLIC BLOOD PRESSURE: 84 MMHG | TEMPERATURE: 98.5 F | HEIGHT: 64 IN | BODY MASS INDEX: 45.97 KG/M2

## 2025-06-25 DIAGNOSIS — K21.9 GASTROESOPHAGEAL REFLUX DISEASE, UNSPECIFIED WHETHER ESOPHAGITIS PRESENT: ICD-10-CM

## 2025-06-25 DIAGNOSIS — R11.2 NAUSEA AND VOMITING, UNSPECIFIED VOMITING TYPE: Primary | ICD-10-CM

## 2025-06-25 LAB
B-HCG UR QL: NEGATIVE
BILIRUB BLD-MCNC: NEGATIVE MG/DL
CLARITY, POC: CLEAR
COLOR UR: YELLOW
EXPIRATION DATE: NORMAL
EXPIRATION DATE: NORMAL
GLUCOSE UR STRIP-MCNC: NEGATIVE MG/DL
INTERNAL NEGATIVE CONTROL: NORMAL
INTERNAL POSITIVE CONTROL: NORMAL
KETONES UR QL: NEGATIVE
LEUKOCYTE EST, POC: NEGATIVE
Lab: NORMAL
Lab: NORMAL
NITRITE UR-MCNC: NEGATIVE MG/ML
PH UR: 6 [PH] (ref 5–8)
PROT UR STRIP-MCNC: NEGATIVE MG/DL
RBC # UR STRIP: NEGATIVE /UL
SP GR UR: 1.03 (ref 1–1.03)
UROBILINOGEN UR QL: NORMAL

## 2025-06-25 PROCEDURE — 81025 URINE PREGNANCY TEST: CPT | Performed by: NURSE PRACTITIONER

## 2025-06-25 PROCEDURE — 81003 URINALYSIS AUTO W/O SCOPE: CPT | Performed by: NURSE PRACTITIONER

## 2025-06-25 PROCEDURE — 99214 OFFICE O/P EST MOD 30 MIN: CPT | Performed by: NURSE PRACTITIONER

## 2025-06-25 RX ORDER — OMEPRAZOLE 40 MG/1
40 CAPSULE, DELAYED RELEASE ORAL DAILY
Qty: 30 CAPSULE | Refills: 2 | Status: SHIPPED | OUTPATIENT
Start: 2025-06-25

## 2025-06-25 RX ORDER — ONDANSETRON 4 MG/1
TABLET, ORALLY DISINTEGRATING ORAL
COMMUNITY
Start: 2025-06-17 | End: 2025-06-25

## 2025-06-25 NOTE — PROGRESS NOTES
"Chief Complaint  Nausea (Vomiting/)    Subjective          Laura Dexter presents to [facility]  Nausea  Symptoms: nausea and vomiting    Symptoms: no abdominal pain, no chest pain, no chills, no congestion, no cough, no fatigue, no fever, no rash, no dysuria and no weakness      History of Present Illness  The patient is a 24-year-old female who presents for evaluation of nausea and heartburn.    She has been experiencing nausea, leading to daily episodes of vomiting, for the past 5 to 6 months. These symptoms occur randomly, without any discernible pattern related to food intake or time of day. She reports no associated abdominal pain. There is a family history of IBS. She has not undergone any endoscopic procedures in the past. She reports no urinary symptoms such as dysuria or hematuria. She retains her gallbladder and appendix. Blood work was done last month, and she saw hematology for iron infusions.  No bowel issues.  Feels fine currently.    She also experiences frequent heartburn, which occasionally disrupts her sleep due to acid reflux. She manages this with intermittent use of Tums.    FAMILY HISTORY  She has a family history of IBS.    Patient Care Team:  Brannon Montiel APRN as PCP - General (Nurse Practitioner)  Jeremias Villanueva MD as Obstetrician (Obstetrics and Gynecology)  Ofe Coyne APRN (Nurse Practitioner)  Kvng Putnam MD as Consulting Physician (Hematology and Oncology)     Objective   Vital Signs:   /84   Pulse 91   Temp 98.5 °F (36.9 °C)   Ht 162.6 cm (64\")   Wt 122 kg (269 lb 4 oz)   SpO2 100%   BMI 46.22 kg/m²     Body mass index is 46.22 kg/m².    Review of Systems   Constitutional:  Negative for chills, fatigue and fever.   HENT:  Negative for congestion.    Eyes:  Negative for visual disturbance.   Respiratory:  Negative for cough, shortness of breath and wheezing.    Cardiovascular:  Negative for chest pain.   Gastrointestinal:  " Positive for nausea, vomiting, GERD and indigestion. Negative for abdominal distention, abdominal pain, anal bleeding, blood in stool, constipation, diarrhea and rectal pain.   Genitourinary:  Negative for decreased urine volume, dysuria, frequency, hematuria and urgency.   Musculoskeletal:  Negative for back pain.   Skin:  Negative for rash.   Neurological:  Negative for weakness and headache.   Psychiatric/Behavioral: Negative.         Past History:  Medical History: has a past medical history of Abnormal uterine bleeding, Anemia, Anxiety, and Mood disorder.   Surgical History: has a past surgical history that includes Tonsillectomy (2007); Adenoidectomy (2007); and Radial Osteotomy (Right).   Family History: family history includes Diabetes in her paternal grandfather and paternal grandmother; Hypertension in her paternal uncle.   Social History: reports that she has never smoked. She has never been exposed to tobacco smoke. She uses smokeless tobacco. She reports current alcohol use. She reports that she does not use drugs.    PHQ-2 Depression Screening  Little interest or pleasure in doing things?     Feeling down, depressed, or hopeless?     PHQ-2 Total Score         PHQ-9 Depression Screening  Little interest or pleasure in doing things?     Feeling down, depressed, or hopeless?     PHQ-2 Total Score     Trouble falling or staying asleep, or sleeping too much?     Feeling tired or having little energy?     Poor appetite or overeating?     Feeling bad about yourself - or that you are a failure or have let yourself or your family down?     Trouble concentrating on things, such as reading the newspaper or watching television?     Moving or speaking so slowly that other people could have noticed? Or the opposite - being so fidgety or restless that you have been moving around a lot more than usual?     Thoughts that you would be better off dead, or of hurting yourself in some way?     PHQ-9 Total Score     If you  checked off any problems, how difficult have these problems made it for you to do your work, take care of things at home, or get along with other people?          PHQ-9 Total Score:        Patient screened positive for depression based on a PHQ-9 score of  on . Follow-up recommendations include:          Current Outpatient Medications:     Effexor XR 37.5 MG 24 hr capsule, , Disp: , Rfl:     loratadine (CLARITIN) 10 MG tablet, Take 1 tablet by mouth Daily., Disp: , Rfl:     norethindrone-ethinyl estradiol (MICROGESTIN) 1-20 MG-MCG per tablet, Take 1 tablet by mouth Daily., Disp: , Rfl:     Vraylar 3 MG capsule capsule, , Disp: , Rfl:     omeprazole (priLOSEC) 40 MG capsule, Take 1 capsule by mouth Daily., Disp: 30 capsule, Rfl: 2   (Not in a hospital admission)     Allergies: Omnicef [cefdinir], Strawberry flavoring agent (non-screening), and Latex    Physical Exam  Constitutional:       Appearance: Normal appearance.   Eyes:      Conjunctiva/sclera: Conjunctivae normal.      Pupils: Pupils are equal, round, and reactive to light.   Cardiovascular:      Rate and Rhythm: Normal rate and regular rhythm.      Heart sounds: Normal heart sounds.   Pulmonary:      Effort: Pulmonary effort is normal.      Breath sounds: Normal breath sounds.   Abdominal:      General: Abdomen is flat. Bowel sounds are normal. There is no distension.      Palpations: Abdomen is soft.      Tenderness: There is no abdominal tenderness. There is no guarding or rebound.      Hernia: No hernia is present.   Skin:     General: Skin is warm.      Findings: No erythema or rash.   Neurological:      General: No focal deficit present.      Mental Status: She is alert and oriented to person, place, and time. Mental status is at baseline.   Psychiatric:         Mood and Affect: Mood normal.         Behavior: Behavior normal.         Thought Content: Thought content normal.         Judgment: Judgment normal.        Physical Exam  Respiratory: Clear to  auscultation, no wheezing, rales or rhonchi  Cardiovascular: Regular rate and rhythm, no murmurs, rubs, or gallops    Result Review :          Results  Labs   - White blood cell count: Slightly elevated             Assessment and Plan    Diagnoses and all orders for this visit:    1. Nausea and vomiting, unspecified vomiting type (Primary)  -     Ambulatory Referral to Gastroenterology  -     CBC & Differential  -     Comprehensive Metabolic Panel  -     POC Urinalysis Dipstick, Automated; Future  -     POC Pregnancy, Urine; Future  -     POC Urinalysis Dipstick, Automated  -     POC Pregnancy, Urine    2. Gastroesophageal reflux disease, unspecified whether esophagitis present  -     omeprazole (priLOSEC) 40 MG capsule; Take 1 capsule by mouth Daily.  Dispense: 30 capsule; Refill: 2  -     Ambulatory Referral to Gastroenterology      Assessment & Plan  1. Nausea and vomiting.  - Symptoms include persistent nausea and vomiting occurring randomly without any specific pattern, ongoing for 5-6 months.  - Physical exam reveals no abdominal pain; recent blood work showed elevated white blood cell count.  - Discussed potential link to an ulcer; patient has a history of iron deficiency treated with iron infusions.  Continue to follow-up with hematology.  - Prescribed a proton pump inhibitor; referral to gastroenterology for further evaluation and endoscopic examination; blood work ordered today to check for infection or blood loss.  UA completed.  Pregnancy test negative.  Risk of meds discussed and understood.  Education provided.  Go to ED if worsens.  Return to clinic or ED with any issues or concerns.    2. Heartburn.  - Reports frequent heartburn and occasional nocturnal acid reflux.  - Physical exam findings include no pain in the stomach area.  - Discussed symptoms management; patient currently uses Tums occasionally.  - Prescribed a proton pump inhibitor to reduce stomach acid production; medication sent to  pharmacy.  Follow-up with gastroenterology.  Risk discussed and understood.                    Follow Up   Return in about 3 months (around 9/25/2025), or if symptoms worsen or fail to improve.  Patient was given instructions and counseling regarding her condition or for health maintenance advice. Please see specific information pulled into the AVS if appropriate.     Patient or patient representative verbalized consent for the use of Ambient Listening during the visit with  ELEAZAR Pena for chart documentation. 6/25/2025  09:38 EDT    ELEAZAR Pena

## 2025-06-26 LAB
ALBUMIN SERPL-MCNC: 4.4 G/DL (ref 4–5)
ALP SERPL-CCNC: 131 IU/L (ref 44–121)
ALT SERPL-CCNC: 24 IU/L (ref 0–32)
AST SERPL-CCNC: 19 IU/L (ref 0–40)
BASOPHILS # BLD AUTO: 0.1 X10E3/UL (ref 0–0.2)
BASOPHILS NFR BLD AUTO: 1 %
BILIRUB SERPL-MCNC: 0.3 MG/DL (ref 0–1.2)
BUN SERPL-MCNC: 12 MG/DL (ref 6–20)
BUN/CREAT SERPL: 16 (ref 9–23)
CALCIUM SERPL-MCNC: 8.9 MG/DL (ref 8.7–10.2)
CHLORIDE SERPL-SCNC: 103 MMOL/L (ref 96–106)
CO2 SERPL-SCNC: 23 MMOL/L (ref 20–29)
CREAT SERPL-MCNC: 0.75 MG/DL (ref 0.57–1)
EGFRCR SERPLBLD CKD-EPI 2021: 115 ML/MIN/1.73
EOSINOPHIL # BLD AUTO: 0.3 X10E3/UL (ref 0–0.4)
EOSINOPHIL NFR BLD AUTO: 3 %
ERYTHROCYTE [DISTWIDTH] IN BLOOD BY AUTOMATED COUNT: 24.6 % (ref 11.7–15.4)
GLOBULIN SER CALC-MCNC: 2.2 G/DL (ref 1.5–4.5)
GLUCOSE SERPL-MCNC: 79 MG/DL (ref 70–99)
HCT VFR BLD AUTO: 44 % (ref 34–46.6)
HGB BLD-MCNC: 12.8 G/DL (ref 11.1–15.9)
IMM GRANULOCYTES # BLD AUTO: 0.1 X10E3/UL (ref 0–0.1)
IMM GRANULOCYTES NFR BLD AUTO: 1 %
LYMPHOCYTES # BLD AUTO: 3.2 X10E3/UL (ref 0.7–3.1)
LYMPHOCYTES NFR BLD AUTO: 26 %
MCH RBC QN AUTO: 24.1 PG (ref 26.6–33)
MCHC RBC AUTO-ENTMCNC: 29.1 G/DL (ref 31.5–35.7)
MCV RBC AUTO: 83 FL (ref 79–97)
MONOCYTES # BLD AUTO: 0.7 X10E3/UL (ref 0.1–0.9)
MONOCYTES NFR BLD AUTO: 5 %
MORPHOLOGY BLD-IMP: ABNORMAL
NEUTROPHILS # BLD AUTO: 8 X10E3/UL (ref 1.4–7)
NEUTROPHILS NFR BLD AUTO: 64 %
PLATELET # BLD AUTO: 371 X10E3/UL (ref 150–450)
POTASSIUM SERPL-SCNC: 4.2 MMOL/L (ref 3.5–5.2)
PROT SERPL-MCNC: 6.6 G/DL (ref 6–8.5)
RBC # BLD AUTO: 5.32 X10E6/UL (ref 3.77–5.28)
SODIUM SERPL-SCNC: 140 MMOL/L (ref 134–144)
WBC # BLD AUTO: 12.3 X10E3/UL (ref 3.4–10.8)

## 2025-06-27 ENCOUNTER — RESULTS FOLLOW-UP (OUTPATIENT)
Dept: FAMILY MEDICINE CLINIC | Facility: CLINIC | Age: 25
End: 2025-06-27
Payer: COMMERCIAL

## 2025-07-01 NOTE — TELEPHONE ENCOUNTER
LM on VM to call back.    HUB to relay message     Please let patient know from labs her white blood cell count has remained elevated.  Her neutrophils and lymphocytes which are types of white blood cells are also elevated.  Her red blood cells are slightly elevated as well.  Due to these counts it is important that she discuss them further with her hematology team with Dr. Putnam and Kymberly Del Toro.      Alkaline phos which can pertain to the liver is slightly elevated.  Likely of no concern but she can discuss this further with gastroenterology when she sees them.  Thanks
